# Patient Record
Sex: FEMALE | Race: WHITE | Employment: FULL TIME | URBAN - METROPOLITAN AREA
[De-identification: names, ages, dates, MRNs, and addresses within clinical notes are randomized per-mention and may not be internally consistent; named-entity substitution may affect disease eponyms.]

---

## 2020-07-23 ENCOUNTER — HOSPITAL ENCOUNTER (OUTPATIENT)
Dept: CT IMAGING | Age: 30
Discharge: HOME | End: 2020-07-23
Payer: COMMERCIAL

## 2020-07-23 DIAGNOSIS — R22.1 NECK MASS: ICD-10-CM

## 2020-07-23 PROCEDURE — 70491 CT SOFT TISSUE NECK W/DYE: CPT | Performed by: OTOLARYNGOLOGY

## 2020-07-23 RX ORDER — SODIUM CHLORIDE 0.9 % (FLUSH) 0.9 %
10 SYRINGE (ML) INJECTION
Status: COMPLETED | OUTPATIENT
Start: 2020-07-23 | End: 2020-07-23

## 2020-07-23 RX ADMIN — Medication 10 ML: at 15:34

## 2020-08-05 ENCOUNTER — HOSPITAL ENCOUNTER (OUTPATIENT)
Dept: ULTRASOUND IMAGING | Age: 30
Discharge: HOME | End: 2020-08-05
Attending: OTOLARYNGOLOGY
Payer: COMMERCIAL

## 2020-08-05 DIAGNOSIS — E04.1 CYST OF THYROID: ICD-10-CM

## 2020-08-05 PROCEDURE — 76536 US EXAM OF HEAD AND NECK: CPT

## 2020-08-18 ENCOUNTER — HOSPITAL ENCOUNTER (OUTPATIENT)
Dept: ULTRASOUND IMAGING | Age: 30
Discharge: HOME | End: 2020-08-18
Attending: OTOLARYNGOLOGY
Payer: COMMERCIAL

## 2020-08-18 DIAGNOSIS — E04.1 NONTOXIC UNINODULAR GOITER: ICD-10-CM

## 2020-08-18 PROCEDURE — 74011000250 HC RX REV CODE- 250

## 2020-08-18 PROCEDURE — 77030014115

## 2020-08-18 PROCEDURE — 88173 CYTOPATH EVAL FNA REPORT: CPT

## 2020-08-18 PROCEDURE — 10005 FNA BX W/US GDN 1ST LES: CPT

## 2020-08-18 PROCEDURE — 88172 CYTP DX EVAL FNA 1ST EA SITE: CPT

## 2020-08-18 RX ORDER — SODIUM BICARBONATE 42 MG/ML
INJECTION, SOLUTION INTRAVENOUS
Status: DISCONTINUED
Start: 2020-08-18 | End: 2020-08-18 | Stop reason: SDUPTHER

## 2020-08-18 RX ORDER — SODIUM BICARBONATE 42 MG/ML
1 INJECTION, SOLUTION INTRAVENOUS
Status: COMPLETED | OUTPATIENT
Start: 2020-08-18 | End: 2020-08-18

## 2020-08-18 RX ORDER — LIDOCAINE HYDROCHLORIDE 10 MG/ML
10 INJECTION, SOLUTION EPIDURAL; INFILTRATION; INTRACAUDAL; PERINEURAL
Status: COMPLETED | OUTPATIENT
Start: 2020-08-18 | End: 2020-08-18

## 2020-08-18 RX ADMIN — SODIUM BICARBONATE 1 ML: 42 INJECTION, SOLUTION INTRAVENOUS at 09:45

## 2020-08-18 RX ADMIN — LIDOCAINE HYDROCHLORIDE 5 ML: 10 INJECTION, SOLUTION EPIDURAL; INFILTRATION; INTRACAUDAL; PERINEURAL at 09:45

## 2020-11-04 ENCOUNTER — TRANSCRIBE ORDER (OUTPATIENT)
Dept: SCHEDULING | Age: 30
End: 2020-11-04

## 2020-11-04 DIAGNOSIS — E04.1 NONTOXIC UNINODULAR GOITER: Primary | ICD-10-CM

## 2020-11-10 ENCOUNTER — HOSPITAL ENCOUNTER (OUTPATIENT)
Dept: ULTRASOUND IMAGING | Age: 30
Discharge: HOME | End: 2020-11-10
Attending: OTOLARYNGOLOGY
Payer: COMMERCIAL

## 2020-11-10 DIAGNOSIS — E04.1 NONTOXIC UNINODULAR GOITER: ICD-10-CM

## 2020-11-10 PROCEDURE — 76536 US EXAM OF HEAD AND NECK: CPT

## 2021-03-23 ENCOUNTER — OFFICE VISIT (OUTPATIENT)
Dept: OBGYN CLINIC | Age: 31
End: 2021-03-23
Payer: COMMERCIAL

## 2021-03-23 VITALS
SYSTOLIC BLOOD PRESSURE: 118 MMHG | DIASTOLIC BLOOD PRESSURE: 67 MMHG | WEIGHT: 187 LBS | BODY MASS INDEX: 29.35 KG/M2 | HEIGHT: 67 IN

## 2021-03-23 DIAGNOSIS — Z01.419 WELL WOMAN EXAM: Primary | ICD-10-CM

## 2021-03-23 PROCEDURE — 99385 PREV VISIT NEW AGE 18-39: CPT | Performed by: OBSTETRICS & GYNECOLOGY

## 2021-03-23 RX ORDER — DEXTROAMPHETAMINE SACCHARATE, AMPHETAMINE ASPARTATE, DEXTROAMPHETAMINE SULFATE AND AMPHETAMINE SULFATE 2.5; 2.5; 2.5; 2.5 MG/1; MG/1; MG/1; MG/1
TABLET ORAL
COMMUNITY

## 2021-03-23 RX ORDER — QUETIAPINE FUMARATE 25 MG/1
TABLET, FILM COATED ORAL
COMMUNITY

## 2021-03-23 RX ORDER — DEXTROAMPHETAMINE SACCHARATE, AMPHETAMINE ASPARTATE MONOHYDRATE, DEXTROAMPHETAMINE SULFATE AND AMPHETAMINE SULFATE 5; 5; 5; 5 MG/1; MG/1; MG/1; MG/1
CAPSULE, EXTENDED RELEASE ORAL
COMMUNITY
End: 2022-10-07 | Stop reason: ALTCHOICE

## 2021-03-23 NOTE — PATIENT INSTRUCTIONS
Well Visit, Ages 25 to 48: Care Instructions  Your Care Instructions     Physical exams can help you stay healthy. Your doctor has checked your overall health and may have suggested ways to take good care of yourself. He or she also may have recommended tests. At home, you can help prevent illness with healthy eating, regular exercise, and other steps. Follow-up care is a key part of your treatment and safety. Be sure to make and go to all appointments, and call your doctor if you are having problems. It's also a good idea to know your test results and keep a list of the medicines you take. How can you care for yourself at home? · Reach and stay at a healthy weight. This will lower your risk for many problems, such as obesity, diabetes, heart disease, and high blood pressure. · Get at least 30 minutes of physical activity on most days of the week. Walking is a good choice. You also may want to do other activities, such as running, swimming, cycling, or playing tennis or team sports. Discuss any changes in your exercise program with your doctor. · Do not smoke or allow others to smoke around you. If you need help quitting, talk to your doctor about stop-smoking programs and medicines. These can increase your chances of quitting for good. · Talk to your doctor about whether you have any risk factors for sexually transmitted infections (STIs). Having one sex partner (who does not have STIs and does not have sex with anyone else) is a good way to avoid these infections. · Use birth control if you do not want to have children at this time. Talk with your doctor about the choices available and what might be best for you. · Protect your skin from too much sun. When you're outdoors from 10 a.m. to 4 p.m., stay in the shade or cover up with clothing and a hat with a wide brim. Wear sunglasses that block UV rays. Even when it's cloudy, put broad-spectrum sunscreen (SPF 30 or higher) on any exposed skin.   · See a dentist one or two times a year for checkups and to have your teeth cleaned. · Wear a seat belt in the car. Follow your doctor's advice about when to have certain tests. These tests can spot problems early. For everyone  · Cholesterol. Have the fat (cholesterol) in your blood tested after age 21. Your doctor will tell you how often to have this done based on your age, family history, or other things that can increase your risk for heart disease. · Blood pressure. Have your blood pressure checked during a routine doctor visit. Your doctor will tell you how often to check your blood pressure based on your age, your blood pressure results, and other factors. · Vision. Talk with your doctor about how often to have a glaucoma test.  · Diabetes. Ask your doctor whether you should have tests for diabetes. · Colon cancer. Your risk for colorectal cancer gets higher as you get older. Some experts say that adults should start regular screening at age 48 and stop at age 76. Others say to start before age 48 or continue after age 76. Talk with your doctor about your risk and when to start and stop screening. For women  · Breast exam and mammogram. Talk to your doctor about when you should have a clinical breast exam and a mammogram. Medical experts differ on whether and how often women under 50 should have these tests. Your doctor can help you decide what is right for you. · Cervical cancer screening test and pelvic exam. Begin with a Pap test at age 24. The test often is part of a pelvic exam. Starting at age 27, you may choose to have a Pap test, an HPV test, or both tests at the same time (called co-testing). Talk with your doctor about how often to have testing. · Tests for sexually transmitted infections (STIs). Ask whether you should have tests for STIs. You may be at risk if you have sex with more than one person, especially if your partners do not wear condoms.   For men  · Tests for sexually transmitted infections (STIs). Ask whether you should have tests for STIs. You may be at risk if you have sex with more than one person, especially if you do not wear a condom. · Testicular cancer exam. Ask your doctor whether you should check your testicles regularly. · Prostate exam. Talk to your doctor about whether you should have a blood test (called a PSA test) for prostate cancer. Experts differ on whether and when men should have this test. Some experts suggest it if you are older than 39 and are -American or have a father or brother who got prostate cancer when he was younger than 72. When should you call for help? Watch closely for changes in your health, and be sure to contact your doctor if you have any problems or symptoms that concern you. Where can you learn more? Go to http://www.russell.com/  Enter P072 in the search box to learn more about \"Well Visit, Ages 25 to 48: Care Instructions. \"  Current as of: May 27, 2020               Content Version: 12.6  © 2006-2020 [employer]. Care instructions adapted under license by [employer] (which disclaims liability or warranty for this information). If you have questions about a medical condition or this instruction, always ask your healthcare professional. [place] [address] any warranty or liability for your use of this information.

## 2021-03-23 NOTE — PROGRESS NOTES
Annual exam ages 21-44    Debby Srinivasan is a No obstetric history on file. ,  27 y.o. female WHITE No LMP recorded. .    She presents for her annual checkup. She is having no significant problems. Life with IUD is good. 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 of the 3 injections. Menstrual status:    Her periods are light, moderate in flow. She is using essentially none pads or tampons per day, minimal to none using Mirena. She denies dysmenorrhea. She reports no premenstrual symptoms. Contraception:    The current method of family planning is IUD. Sexual history:     She  has no history on file for sexual activity. .    Medical conditions:    Since her last annual GYN exam about one year ago, she has not the following changes in her health history: none. Pap and Mammogram History:    Her most recent Pap smear was normal, obtained 1 year(s) ago. Breast Cancer History/Substance Abuse: negative    No past medical history on file. Past Surgical History:   Procedure Laterality Date    HX WISDOM TEETH EXTRACTION         Current Outpatient Medications   Medication Sig Dispense Refill    noreth-ethinyl estradiol-iron (GENERESS FE) 0.8mg-25mcg(24) & 75 mg (4) Chew Take  by mouth daily. Allergies: Codeine     Tobacco History:  reports that she has quit smoking. She does not have any smokeless tobacco history on file. Alcohol Abuse:  reports current alcohol use. Drug Abuse:  has no history on file for drug. Family Medical/Cancer History: No family history on file.      Review of Systems - History obtained from the patient  Constitutional: negative for weight loss, fever, night sweats  HEENT: negative for hearing loss, earache, congestion, snoring, sorethroat  CV: negative for chest pain, palpitations, edema  Resp: negative for cough, shortness of breath, wheezing  GI: negative for change in bowel habits, abdominal pain, black or bloody stools  : negative for frequency, dysuria, hematuria, vaginal discharge  MSK: negative for back pain, joint pain, muscle pain  Breast: negative for breast lumps, nipple discharge, galactorrhea  Skin :negative for itching, rash, hives  Neuro: negative for dizziness, headache, confusion, weakness  Psych: negative for anxiety, depression, change in mood  Heme/lymph: negative for bleeding, bruising, pallor    Physical Exam    There were no vitals taken for this visit.     Constitutional  · Appearance: well-nourished, well developed, alert, in no acute distress    HENT  · Head and Face: appears normal    Chest  · Respiratory Effort: breathing unlabored    Breasts  · Inspection of Breasts: breasts symmetrical, no skin changes, no discharge present, nipple appearance normal, no skin retraction present  · Palpation of Breasts and Axillae: no masses present on palpation, no breast tenderness  · Axillary Lymph Nodes: no lymphadenopathy present    Gastrointestinal  · Abdominal Examination: abdomen non-tender to palpation, normal bowel sounds, no masses present  · Liver and spleen: no hepatomegaly present, spleen not palpable  · Hernias: no hernias identified    Genitourinary  · External Genitalia: normal appearance for age, no discharge present, no tenderness present, no inflammatory lesions present, no masses present, no atrophy present  · Vagina: normal vaginal vault without central or paravaginal defects, no discharge present, no inflammatory lesions present, no masses present  · Bladder: non-tender to palpation  · Urethra: appears normal  · Cervix: normal IUD string visualized  · Uterus: normal size, shape and consistency  · Adnexa: no adnexal tenderness present, no adnexal masses present  · Perineum: perineum within normal limits, no evidence of trauma, no rashes or skin lesions present  · Anus: anus within normal limits, no hemorrhoids present  · Inguinal Lymph Nodes: no lymphadenopathy present    Skin  · General Inspection: no rash, no lesions identified    Neurologic/Psychiatric  · Mental Status:  · Orientation: grossly oriented to person, place and time  · Mood and Affect: mood normal, affect appropriate    . Assessment:  Routine gynecologic examination  Her current medical status is satisfactory with no evidence of significant gynecologic issues. Plan:  GC/ chlamydia offered and declined  Patient offered and completed [facility] genetic screening questionnaire  and no changes in personal and family pertinent medical history. Patient declines presence of chaperone during today's visit.    Pap done today  Counseled re: diet, exercise, healthy lifestyle  Return for yearly wellness visits  Gardisil counseling provided  Rec screening mammo at either 34 or 39

## 2021-03-25 LAB
CYTOLOGIST CVX/VAG CYTO: NORMAL
CYTOLOGY CVX/VAG DOC CYTO: NORMAL
CYTOLOGY CVX/VAG DOC THIN PREP: NORMAL
DX ICD CODE: NORMAL
LABCORP, 190119: NORMAL
Lab: NORMAL
OTHER STN SPEC: NORMAL
STAT OF ADQ CVX/VAG CYTO-IMP: NORMAL

## 2021-04-21 ENCOUNTER — TRANSCRIBE ORDER (OUTPATIENT)
Dept: SCHEDULING | Age: 31
End: 2021-04-21

## 2021-04-21 DIAGNOSIS — E04.1 NONTOXIC UNINODULAR GOITER: Primary | ICD-10-CM

## 2021-04-28 ENCOUNTER — HOSPITAL ENCOUNTER (OUTPATIENT)
Dept: ULTRASOUND IMAGING | Age: 31
Discharge: HOME | End: 2021-04-28
Attending: OTOLARYNGOLOGY
Payer: COMMERCIAL

## 2021-04-28 DIAGNOSIS — E04.1 NONTOXIC UNINODULAR GOITER: ICD-10-CM

## 2021-04-28 PROCEDURE — 76536 US EXAM OF HEAD AND NECK: CPT

## 2022-03-02 ENCOUNTER — OFFICE VISIT (OUTPATIENT)
Dept: INTERNAL MEDICINE CLINIC | Age: 32
End: 2022-03-02
Payer: COMMERCIAL

## 2022-03-02 VITALS
TEMPERATURE: 98.2 F | DIASTOLIC BLOOD PRESSURE: 88 MMHG | SYSTOLIC BLOOD PRESSURE: 131 MMHG | RESPIRATION RATE: 19 BRPM | BODY MASS INDEX: 31.32 KG/M2 | WEIGHT: 200 LBS | HEART RATE: 122 BPM

## 2022-03-02 DIAGNOSIS — F90.9 ATTENTION DEFICIT HYPERACTIVITY DISORDER (ADHD), UNSPECIFIED ADHD TYPE: ICD-10-CM

## 2022-03-02 DIAGNOSIS — R00.0 TACHYCARDIA: ICD-10-CM

## 2022-03-02 DIAGNOSIS — Z76.89 ESTABLISHING CARE WITH NEW DOCTOR, ENCOUNTER FOR: Primary | ICD-10-CM

## 2022-03-02 DIAGNOSIS — E66.9 OBESITY (BMI 30.0-34.9): ICD-10-CM

## 2022-03-02 PROCEDURE — 93000 ELECTROCARDIOGRAM COMPLETE: CPT | Performed by: INTERNAL MEDICINE

## 2022-03-02 PROCEDURE — 99203 OFFICE O/P NEW LOW 30 MIN: CPT | Performed by: INTERNAL MEDICINE

## 2022-03-02 RX ORDER — MELATONIN 5 MG
5 CAPSULE ORAL
COMMUNITY
Start: 2020-08-01

## 2022-03-02 RX ORDER — CHOLECALCIFEROL (VITAMIN D3) 125 MCG
5000 CAPSULE ORAL DAILY
COMMUNITY
End: 2022-03-16

## 2022-03-02 RX ORDER — MINERAL OIL
180 ENEMA (ML) RECTAL
COMMUNITY

## 2022-03-02 NOTE — PROGRESS NOTES
Chief Complaint   Patient presents with   Jo Hi Establish Care     1. Have you been to the ER, urgent care clinic since your last visit? Hospitalized since your last visit? No    2. Have you seen or consulted any other health care providers outside of the [address] [place] [address] since your last visit? Include any pap smears or colon screening.  No

## 2022-03-02 NOTE — PROGRESS NOTES
Rupinder Sauceda is a 27 y.o. female and presents with [facility]    . Subjective:    New patient. Establish Care    Pt will be attempting pregnancy in the next 6-12 months  STAT EKG- sinus tach @ 120bpm    PMH- ADD- Dr. Luis Ennis- psychiatrist   -pt has been taking extra short acting adderall bc she has not received her short acting as yet     Allergic rhinitis    SH-    -pt has an IUD in place    FH- mother alive HTN, obesity, depression, hypothyroidism   Father  2020 cholangiocarcinoma,  HTN, obesity   1 brother GERD    HM  Immunizations  Eye care  Dental care  Pap UTD        Review of Systems  Review of systems (12) negative, except noted above. History reviewed. No pertinent past medical history. Past Surgical History:   Procedure Laterality Date    HX WISDOM TEETH EXTRACTION       Social History     Socioeconomic History    Marital status: SINGLE   Tobacco Use    Smoking status: Former Smoker    Smokeless tobacco: Never Used   Vaping Use    Vaping Use: Never used   Substance and Sexual Activity    Alcohol use: Yes    Drug use: Never    Sexual activity: Yes     Partners: Male     Birth control/protection: I.U.D. History reviewed. No pertinent family history.     Allergies   Allergen Reactions    Oxycodone Nausea Only and Nausea and Vomiting    Codeine Nausea Only       Objective:  Visit Vitals  /88 (BP 1 Location: Left upper arm, BP Patient Position: Sitting, BP Cuff Size: Adult)   Pulse (!) 122   Temp 98.2 °F (36.8 °C) (Temporal)   Resp 19   Ht (P) 5' 7\" (1.702 m)   Wt 200 lb (90.7 kg)   BMI (P) 31.32 kg/m²     Physical Exam:   General appearance - alert, well appearing, and in no distress  Mental status - alert, oriented to person, place, and time  EYE-LISA, EOMI, corneas normal, no foreign bodies  ENT-ENT exam normal, no neck nodes or sinus tenderness  Mouth - mucous membranes moist, pharynx normal without lesions  Neck - supple, no significant adenopathy   Chest - clear to auscultation, no wheezes, rales or rhonchi, symmetric air entry   Heart - tachycardic  Abdomen - soft, nontender, nondistended, no masses or organomegaly  Ext-peripheral pulses normal, no pedal edema, no clubbing or cyanosis  Skin-Warm and dry. no hyperpigmentation, vitiligo, or suspicious lesions  Neuro -alert, oriented, normal speech, no focal findings or movement disorder noted        Results for orders placed or performed in visit on 03/23/21   PAP IG, RFX APTIMA HPV ASCUS ([id number])   Result Value Ref Range    Diagnosis Comment     Specimen adequacy Comment     Clinician provided ICD10 Comment     Performed by: Comment     . Ishan Nishant Note: Comment     Test methodology Comment     . Comment        Assessment/Plan:    ICD-10-CM ICD-9-CM    1. Establishing care with new doctor, encounter for  Z76.89 V65.8    2. Attention deficit hyperactivity disorder (ADHD), unspecified ADHD type  H15.6 728.18 METABOLIC PANEL, COMPREHENSIVE      LIPID PANEL      CBC WITH AUTOMATED DIFF      THYROID CASCADE PROFILE      VITAMIN D, 25 HYDROXY      METABOLIC PANEL, COMPREHENSIVE      LIPID PANEL      CBC WITH AUTOMATED DIFF      THYROID CASCADE PROFILE      VITAMIN D, 25 HYDROXY   3. Tachycardia  R00.0 785.0 AMB POC EKG ROUTINE W/ 12 LEADS, INTER & REP      CARDIAC HOLTER MONITOR   4. Obesity (BMI 30.0-34. 9)  R94.1 705.48 METABOLIC PANEL, COMPREHENSIVE      LIPID PANEL      CBC WITH AUTOMATED DIFF      THYROID CASCADE PROFILE      VITAMIN D, 25 HYDROXY      METABOLIC PANEL, COMPREHENSIVE      LIPID PANEL      CBC WITH AUTOMATED DIFF      THYROID CASCADE PROFILE      VITAMIN D, 25 HYDROXY     Orders Placed This Encounter    METABOLIC PANEL, COMPREHENSIVE     Standing Status:   Future     Number of Occurrences:   1     Standing Expiration Date:   3/2/2023    LIPID PANEL     Standing Status:   Future     Number of Occurrences:   1     Standing Expiration Date:   3/2/2023    CBC WITH AUTOMATED DIFF     Standing Status:   Future Number of Occurrences:   1     Standing Expiration Date:   3/2/2023    THYROID CASCADE PROFILE     Standing Status:   Future     Number of Occurrences:   1     Standing Expiration Date:   3/2/2023    VITAMIN D, 25 HYDROXY     Standing Status:   Future     Number of Occurrences:   1     Standing Expiration Date:   3/2/2023    AMB POC EKG ROUTINE W/ 12 LEADS, INTER & REP     Order Specific Question:   Reason for Exam:     Answer:   tachycardia    cholecalciferol (VITAMIN D3) (5000 Units /125 mcg) capsule     Sig: Take 5,000 Units by mouth daily.  fexofenadine (Allegra Allergy) 180 mg tablet     Sig: Take 180 mg by mouth daily as needed.  melatonin 5 mg cap capsule     Sig: Take 5 mg by mouth nightly.  zinc 50 mg tab tablet     Sig: Take 50 mg by mouth daily. 1. Establishing care with new doctor, encounter for  Completed    2. Attention deficit hyperactivity disorder (ADHD), unspecified ADHD type  F/u psych  - METABOLIC PANEL, COMPREHENSIVE; Future  - LIPID PANEL; Future  - CBC WITH AUTOMATED DIFF; Future  - THYROID CASCADE PROFILE; Future  - VITAMIN D, 25 HYDROXY; Future  - METABOLIC PANEL, COMPREHENSIVE  - LIPID PANEL  - CBC WITH AUTOMATED DIFF  - THYROID CASCADE PROFILE  - VITAMIN D, 25 HYDROXY    3. Tachycardia  ? Due to extra adderall  - AMB POC EKG ROUTINE W/ 12 LEADS, INTER & REP  - CARDIAC HOLTER MONITOR; Future    4. Obesity (BMI 30.0-34. 9)  D/w pt daily walking (at least 20 minutes), healthy diet w fruits and/or veggies w each meal, portion sizes and weight loss    - METABOLIC PANEL, COMPREHENSIVE; Future  - LIPID PANEL; Future  - CBC WITH AUTOMATED DIFF; Future  - THYROID CASCADE PROFILE; Future  - VITAMIN D, 25 HYDROXY; Future  - METABOLIC PANEL, COMPREHENSIVE  - LIPID PANEL  - CBC WITH AUTOMATED DIFF  - THYROID CASCADE PROFILE  - VITAMIN D, 25 HYDROXY    There are no Patient Instructions on file for this visit.    Follow-up and Dispositions    · Return in about 6 weeks (around 4/13/2022). I have reviewed with the patient details of the assessment and plan and all questions were answered. Relevent patient education was performed. The most recent lab findings were reviewed with the patient. An After Visit Summary was printed and given to the patient.       Annel Malloy MD

## 2022-03-03 LAB
25(OH)D3+25(OH)D2 SERPL-MCNC: 27.6 NG/ML (ref 30–100)
ALBUMIN SERPL-MCNC: 4.8 G/DL (ref 3.8–4.8)
ALBUMIN/GLOB SERPL: 2.1 {RATIO} (ref 1.2–2.2)
ALP SERPL-CCNC: 71 IU/L (ref 44–121)
ALT SERPL-CCNC: 18 IU/L (ref 0–32)
AST SERPL-CCNC: 17 IU/L (ref 0–40)
BASOPHILS # BLD AUTO: 0 X10E3/UL (ref 0–0.2)
BASOPHILS NFR BLD AUTO: 0 %
BILIRUB SERPL-MCNC: 0.5 MG/DL (ref 0–1.2)
BUN SERPL-MCNC: 11 MG/DL (ref 6–20)
BUN/CREAT SERPL: 13 (ref 9–23)
CALCIUM SERPL-MCNC: 10 MG/DL (ref 8.7–10.2)
CHLORIDE SERPL-SCNC: 102 MMOL/L (ref 96–106)
CHOLEST SERPL-MCNC: 186 MG/DL (ref 100–199)
CO2 SERPL-SCNC: 23 MMOL/L (ref 20–29)
CREAT SERPL-MCNC: 0.86 MG/DL (ref 0.57–1)
EGFR: 92 ML/MIN/1.73
EOSINOPHIL # BLD AUTO: 0.1 X10E3/UL (ref 0–0.4)
EOSINOPHIL NFR BLD AUTO: 1 %
ERYTHROCYTE [DISTWIDTH] IN BLOOD BY AUTOMATED COUNT: 12.6 % (ref 11.7–15.4)
GLOBULIN SER CALC-MCNC: 2.3 G/DL (ref 1.5–4.5)
GLUCOSE SERPL-MCNC: 92 MG/DL (ref 65–99)
HCT VFR BLD AUTO: 38.4 % (ref 34–46.6)
HDLC SERPL-MCNC: 54 MG/DL
HGB BLD-MCNC: 12.9 G/DL (ref 11.1–15.9)
IMM GRANULOCYTES # BLD AUTO: 0 X10E3/UL (ref 0–0.1)
IMM GRANULOCYTES NFR BLD AUTO: 0 %
IMP & REVIEW OF LAB RESULTS: NORMAL
INTERPRETATION: NORMAL
INTERPRETIVE COMMENT, 330017: NORMAL
LDLC SERPL CALC-MCNC: 105 MG/DL (ref 0–99)
LYMPHOCYTES # BLD AUTO: 2.3 X10E3/UL (ref 0.7–3.1)
LYMPHOCYTES NFR BLD AUTO: 28 %
MCH RBC QN AUTO: 29.3 PG (ref 26.6–33)
MCHC RBC AUTO-ENTMCNC: 33.6 G/DL (ref 31.5–35.7)
MCV RBC AUTO: 87 FL (ref 79–97)
MONOCYTES # BLD AUTO: 0.6 X10E3/UL (ref 0.1–0.9)
MONOCYTES NFR BLD AUTO: 7 %
NEUTROPHILS # BLD AUTO: 5.2 X10E3/UL (ref 1.4–7)
NEUTROPHILS NFR BLD AUTO: 64 %
PLATELET # BLD AUTO: 272 X10E3/UL (ref 150–450)
POTASSIUM SERPL-SCNC: 4.2 MMOL/L (ref 3.5–5.2)
PROT SERPL-MCNC: 7.1 G/DL (ref 6–8.5)
RBC # BLD AUTO: 4.41 X10E6/UL (ref 3.77–5.28)
SODIUM SERPL-SCNC: 140 MMOL/L (ref 134–144)
T4 FREE SERPL-MCNC: 1.3 NG/DL (ref 0.82–1.77)
THYROPEROXIDASE AB SERPL-ACNC: <8 IU/ML (ref 0–34)
TRIGL SERPL-MCNC: 153 MG/DL (ref 0–149)
TSH SERPL DL<=0.005 MIU/L-ACNC: 4.65 UIU/ML (ref 0.45–4.5)
VLDLC SERPL CALC-MCNC: 27 MG/DL (ref 5–40)
WBC # BLD AUTO: 8.1 X10E3/UL (ref 3.4–10.8)

## 2022-03-09 ENCOUNTER — HOSPITAL ENCOUNTER (OUTPATIENT)
Dept: VASCULAR SURGERY | Age: 32
Discharge: HOME | End: 2022-03-09
Payer: COMMERCIAL

## 2022-03-09 DIAGNOSIS — R00.0 TACHYCARDIA: ICD-10-CM

## 2022-03-09 PROCEDURE — 93225 XTRNL ECG REC<48 HRS REC: CPT

## 2022-03-09 NOTE — PROGRESS NOTES
24 hour Holter monitor placed on patient. Patient educated on device and extra supplies provided. No questions at this time.      Monitor # [id number]

## 2022-03-16 ENCOUNTER — OFFICE VISIT (OUTPATIENT)
Dept: INTERNAL MEDICINE CLINIC | Age: 32
End: 2022-03-16
Payer: COMMERCIAL

## 2022-03-16 VITALS
BODY MASS INDEX: 30.45 KG/M2 | DIASTOLIC BLOOD PRESSURE: 85 MMHG | WEIGHT: 194 LBS | HEIGHT: 67 IN | HEART RATE: 92 BPM | OXYGEN SATURATION: 98 % | SYSTOLIC BLOOD PRESSURE: 116 MMHG | TEMPERATURE: 98.2 F | RESPIRATION RATE: 19 BRPM

## 2022-03-16 DIAGNOSIS — F90.9 ATTENTION DEFICIT HYPERACTIVITY DISORDER (ADHD), UNSPECIFIED ADHD TYPE: ICD-10-CM

## 2022-03-16 DIAGNOSIS — E55.9 VITAMIN D DEFICIENCY: ICD-10-CM

## 2022-03-16 DIAGNOSIS — R00.0 TACHYCARDIA: Primary | ICD-10-CM

## 2022-03-16 PROCEDURE — 99214 OFFICE O/P EST MOD 30 MIN: CPT | Performed by: INTERNAL MEDICINE

## 2022-03-16 RX ORDER — LEVONORGESTREL 52 MG/1
INTRAUTERINE DEVICE INTRAUTERINE
COMMUNITY
Start: 2020-01-29

## 2022-03-16 RX ORDER — ERGOCALCIFEROL 1.25 MG/1
50000 CAPSULE ORAL
Qty: 12 CAPSULE | Refills: 0 | Status: SHIPPED | OUTPATIENT
Start: 2022-03-16 | End: 2022-10-07 | Stop reason: ALTCHOICE

## 2022-03-16 RX ORDER — SODIUM FLUORIDE1.1%, POTASSIUM NITRATE 5% 5.8; 57.5 MG/ML; MG/ML
GEL, DENTIFRICE DENTAL
COMMUNITY
Start: 2022-03-13

## 2022-03-16 RX ORDER — SPIRONOLACTONE 100 MG/1
50 TABLET, FILM COATED ORAL 2 TIMES DAILY
COMMUNITY
Start: 2022-02-15 | End: 2022-10-07 | Stop reason: ALTCHOICE

## 2022-03-16 NOTE — PROGRESS NOTES
Chief Complaint   Patient presents with    Labs    Follow-up     Holter monitor       1. Have you been to the ER, urgent care clinic since your last visit? Hospitalized since your last visit? No    2. Have you seen or consulted any other health care providers outside of the [address] [place] [address] since your last visit? Include any pap smears or colon screening.  No

## 2022-03-16 NOTE — PROGRESS NOTES
Edgar Aleman is a 27 y.o. female and presents with Labs and Follow-up (Holter monitor)    . Subjective:    Pt is here for f/u tachycardia. Pts pulse has decreased as she is back on her usual adderall dose. Pts holter result not reported as yet    Pt requests referrals to establish w new therapist and psychiatrist    Pt will be attempting pregnancy in the next 6-12 months. She has an appt pending w gyn to discuss adderrall and pregnancy    Pt reports she has been taking 5000IU vit d, when her vit d level was drawn    Lab Results   Component Value Date/Time    VITAMIN D, 25-HYDROXY 27.6 (L) 2022 10:58 AM       PMH- ADD- Dr. Jimbo Wolf- psychiatrist   -pt has been taking extra short acting adderall bc she has not received her long acting as yet     Allergic rhinitis    SH-    -pt has an IUD in place    FH- mother alive HTN, obesity, depression, hypothyroidism   Father  2020 cholangiocarcinoma,  HTN, obesity   1 brother GERD    HM  Immunizations  Eye care  Dental care  Pap UTD        Review of Systems  Review of systems (12) negative, except noted above. No past medical history on file. Past Surgical History:   Procedure Laterality Date    HX WISDOM TEETH EXTRACTION       Social History     Socioeconomic History    Marital status:    Tobacco Use    Smoking status: Former Smoker    Smokeless tobacco: Never Used   Vaping Use    Vaping Use: Never used   Substance and Sexual Activity    Alcohol use: Yes    Drug use: Never    Sexual activity: Yes     Partners: Male     Birth control/protection: I.U.D. No family history on file.     Allergies   Allergen Reactions    Oxycodone Nausea Only and Nausea and Vomiting    Codeine Nausea Only       Objective:  Visit Vitals  Temp 98.2 °F (36.8 °C) (Temporal)   Ht 5' 7\" (1.702 m)   Wt 194 lb (88 kg)   BMI 30.38 kg/m²     Physical Exam:   General appearance - alert, well appearing, and in no distress  Mental status - alert, oriented to person, place, and time  EYE-LISA, EOMI, corneas normal, no foreign bodies  ENT-ENT exam normal, no neck nodes or sinus tenderness  Mouth - mucous membranes moist, pharynx normal without lesions  Neck - supple, no significant adenopathy   Chest - clear to auscultation, no wheezes, rales or rhonchi, symmetric air entry   Heart - tachycardic  Abdomen - soft, nontender, nondistended, no masses or organomegaly  Ext-peripheral pulses normal, no pedal edema, no clubbing or cyanosis  Skin-Warm and dry. no hyperpigmentation, vitiligo, or suspicious lesions  Neuro -alert, oriented, normal speech, no focal findings or movement disorder noted        Results for orders placed or performed in visit on 76/35/49   METABOLIC PANEL, COMPREHENSIVE   Result Value Ref Range    Glucose 92 65 - 99 mg/dL    BUN 11 6 - 20 mg/dL    Creatinine 0.86 0.57 - 1.00 mg/dL    eGFR 92 >59 mL/min/1.73    BUN/Creatinine ratio 13 9 - 23    Sodium 140 134 - 144 mmol/L    Potassium 4.2 3.5 - 5.2 mmol/L    Chloride 102 96 - 106 mmol/L    CO2 23 20 - 29 mmol/L    Calcium 10.0 8.7 - 10.2 mg/dL    Protein, total 7.1 6.0 - 8.5 g/dL    Albumin 4.8 3.8 - 4.8 g/dL    GLOBULIN, TOTAL 2.3 1.5 - 4.5 g/dL    A-G Ratio 2.1 1.2 - 2.2    Bilirubin, total 0.5 0.0 - 1.2 mg/dL    Alk.  phosphatase 71 44 - 121 IU/L    AST (SGOT) 17 0 - 40 IU/L    ALT (SGPT) 18 0 - 32 IU/L   LIPID PANEL   Result Value Ref Range    Cholesterol, total 186 100 - 199 mg/dL    Triglyceride 153 (H) 0 - 149 mg/dL    HDL Cholesterol 54 >39 mg/dL    VLDL, calculated 27 5 - 40 mg/dL    LDL, calculated 105 (H) 0 - 99 mg/dL   CBC WITH AUTOMATED DIFF   Result Value Ref Range    WBC 8.1 3.4 - 10.8 x10E3/uL    RBC 4.41 3.77 - 5.28 x10E6/uL    HGB 12.9 11.1 - 15.9 g/dL    HCT 38.4 34.0 - 46.6 %    MCV 87 79 - 97 fL    MCH 29.3 26.6 - 33.0 pg    MCHC 33.6 31.5 - 35.7 g/dL    RDW 12.6 11.7 - 15.4 %    PLATELET 152 039 - 573 x10E3/uL    NEUTROPHILS 64 Not Estab. %    Lymphocytes 28 Not Estab. %    MONOCYTES 7 Not Estab. %    EOSINOPHILS 1 Not Estab. %    BASOPHILS 0 Not Estab. %    ABS. NEUTROPHILS 5.2 1.4 - 7.0 x10E3/uL    Abs Lymphocytes 2.3 0.7 - 3.1 x10E3/uL    ABS. MONOCYTES 0.6 0.1 - 0.9 x10E3/uL    ABS. EOSINOPHILS 0.1 0.0 - 0.4 x10E3/uL    ABS. BASOPHILS 0.0 0.0 - 0.2 x10E3/uL    IMMATURE GRANULOCYTES 0 Not Estab. %    ABS. IMM. GRANS. 0.0 0.0 - 0.1 x10E3/uL   THYROID CASCADE PROFILE   Result Value Ref Range    TSH 4.650 (H) 0.450 - 4.500 uIU/mL   VITAMIN D, 25 HYDROXY   Result Value Ref Range    VITAMIN D, 25-HYDROXY 27.6 (L) 30.0 - 100.0 ng/mL   CVD REPORT   Result Value Ref Range    INTERPRETATION Note    CKD REPORT   Result Value Ref Range    Interpretation Note    THYROXINE (T4) FREE, DIRECT, S   Result Value Ref Range    T4,Free,(Direct) 1.30 0.82 - 1.77 ng/dL   THYROID PEROXIDASE (TPO) AB   Result Value Ref Range    Thyroid peroxidase (TPO) Ab <8 0 - 34 IU/mL    Interpretive Comment Comment        Assessment/Plan:  No diagnosis found. Orders Placed This Encounter    spironolactone (ALDACTONE) 100 mg tablet     Sig: Take 100 mg by mouth daily.  levonorgestreL (Mirena) 20 mcg/24 hours (7 yrs) 52 mg IUD     Sig: Mirena 20 mcg/24 hours (5 yrs) 52 mg intrauterine device   Take 1 device every day by intrauteri route.  sodium fluoride-pot nitrate 1.1-5 % pste       1. Tachycardia  Resolved    2. Vitamin D deficiency    - ergocalciferol (ERGOCALCIFEROL) 1,250 mcg (50,000 unit) capsule; Take 1 Capsule by mouth every seven (7) days. Dispense: 12 Capsule; Refill: 0    3. Attention deficit hyperactivity disorder (ADHD), unspecified ADHD type    - REFERRAL TO PSYCHIATRY  - REFERRAL TO BEHAVIORAL HEALTH      There are no Patient Instructions on file for this visit. I have reviewed with the patient details of the assessment and plan and all questions were answered. Relevent patient education was performed. The most recent lab findings were reviewed with the patient.     An After Visit Summary was printed and given to the patient.       Leo Watts MD

## 2022-03-17 ENCOUNTER — VIRTUAL VISIT (OUTPATIENT)
Dept: SOCIAL WORK | Age: 32
End: 2022-03-17
Payer: COMMERCIAL

## 2022-03-17 DIAGNOSIS — F43.21 COMPLICATED GRIEF: ICD-10-CM

## 2022-03-17 DIAGNOSIS — F90.9 ATTENTION DEFICIT HYPERACTIVITY DISORDER (ADHD), UNSPECIFIED ADHD TYPE: ICD-10-CM

## 2022-03-17 DIAGNOSIS — F39 MOOD DISORDER (HCC): Primary | ICD-10-CM

## 2022-03-18 PROBLEM — Z76.89 ESTABLISHING CARE WITH NEW DOCTOR, ENCOUNTER FOR: Status: ACTIVE | Noted: 2022-03-02

## 2022-03-18 PROCEDURE — 93227 XTRNL ECG REC<48 HR R&I: CPT | Performed by: INTERNAL MEDICINE

## 2022-03-19 PROBLEM — F90.9 ATTENTION DEFICIT HYPERACTIVITY DISORDER (ADHD), UNSPECIFIED ADHD TYPE: Status: ACTIVE | Noted: 2022-03-02

## 2022-03-24 PROBLEM — F39 MOOD DISORDER (HCC): Status: ACTIVE | Noted: 2022-03-17

## 2022-03-24 PROBLEM — F43.21 COMPLICATED GRIEF: Status: ACTIVE | Noted: 2022-03-17

## 2022-04-04 ENCOUNTER — OFFICE VISIT (OUTPATIENT)
Dept: OBGYN CLINIC | Age: 32
End: 2022-04-04
Payer: COMMERCIAL

## 2022-04-04 VITALS — SYSTOLIC BLOOD PRESSURE: 128 MMHG | WEIGHT: 195 LBS | BODY MASS INDEX: 30.54 KG/M2 | DIASTOLIC BLOOD PRESSURE: 78 MMHG

## 2022-04-04 DIAGNOSIS — Z01.419 ENCOUNTER FOR GYNECOLOGICAL EXAMINATION WITHOUT ABNORMAL FINDING: Primary | ICD-10-CM

## 2022-04-04 PROCEDURE — 99395 PREV VISIT EST AGE 18-39: CPT | Performed by: OBSTETRICS & GYNECOLOGY

## 2022-04-04 NOTE — PROGRESS NOTES
Annual exam ages 21-44    Sandra Persaud is a ,  34 y.o. female WHITE/NON- No LMP recorded. (Menstrual status: IUD). .    She presents for her annual checkup. She is having no significant problems. Thinking of trying to conceive later on this year. Menstrual status:    Her periods are spotting in flow. She denies dysmenorrhea. She reports no premenstrual symptoms. Contraception:    The current method of family planning is IUD. Sexual history:     She  reports being sexually active and has had partner(s) who are male. She reports using the following method of birth control/protection: I.U.D.. Laura Ye Medical conditions:    Since her last annual GYN exam about one year ago, she has not the following changes in her health history: none. Pap and Mammogram History:    Her most recent Pap smear was normal, obtained 1 year(s) ago. Breast Cancer History/Substance Abuse: negative    No past medical history on file. Past Surgical History:   Procedure Laterality Date    HX WISDOM TEETH EXTRACTION         Current Outpatient Medications   Medication Sig Dispense Refill    spironolactone (ALDACTONE) 100 mg tablet Take 100 mg by mouth daily.  levonorgestreL (Mirena) 20 mcg/24 hours (7 yrs) 52 mg IUD Mirena 20 mcg/24 hours (5 yrs) 52 mg intrauterine device   Take 1 device every day by intrauteri route.  sodium fluoride-pot nitrate 1.1-5 % pste       ergocalciferol (ERGOCALCIFEROL) 1,250 mcg (50,000 unit) capsule Take 1 Capsule by mouth every seven (7) days. 12 Capsule 0    fexofenadine (Allegra Allergy) 180 mg tablet Take 180 mg by mouth daily as needed.  melatonin 5 mg cap capsule Take 5 mg by mouth nightly.  zinc 50 mg tab tablet Take 50 mg by mouth daily.       amphetamine-dextroamphetamine XR (ADDERALL XR) 20 mg XR capsule dextroamphetamine-amphetamine ER 20 mg 24hr capsule,extend release      QUEtiapine (SEROquel) 25 mg tablet quetiapine 25 mg tablet      dextroamphetamine-amphetamine (ADDERALL) 10 mg tablet dextroamphetamine-amphetamine 10 mg tablet       Allergies: Oxycodone and Codeine     Tobacco History:  reports that she has quit smoking. She has never used smokeless tobacco.  Alcohol Abuse:  reports current alcohol use. Drug Abuse:  reports no history of drug use. Family Medical/Cancer History: No family history on file.      Review of Systems - History obtained from the patient  Constitutional: negative for weight loss, fever, night sweats  HEENT: negative for hearing loss, earache, congestion, snoring, sorethroat  CV: negative for chest pain, palpitations, edema  Resp: negative for cough, shortness of breath, wheezing  GI: negative for change in bowel habits, abdominal pain, black or bloody stools  : negative for frequency, dysuria, hematuria, vaginal discharge  MSK: negative for back pain, joint pain, muscle pain  Breast: negative for breast lumps, nipple discharge, galactorrhea  Skin :negative for itching, rash, hives  Neuro: negative for dizziness, headache, confusion, weakness  Psych: negative for anxiety, depression, change in mood  Heme/lymph: negative for bleeding, bruising, pallor    Physical Exam    Visit Vitals  /78 (BP 1 Location: Left upper arm, BP Patient Position: Sitting)   Wt 195 lb (88.5 kg)   BMI 30.54 kg/m²       Constitutional  · Appearance: well-nourished, well developed, alert, in no acute distress    HENT  · Head and Face: appears normal    Chest  · Respiratory Effort: breathing unlabored    Breasts  · Inspection of Breasts: breasts symmetrical, no skin changes, no discharge present, nipple appearance normal, no skin retraction present  · Palpation of Breasts and Axillae: no masses present on palpation, no breast tenderness  · Axillary Lymph Nodes: no lymphadenopathy present    Gastrointestinal  · Abdominal Examination: abdomen non-tender to palpation, normal bowel sounds, no masses present  · Liver and spleen: no hepatomegaly present, spleen not palpable  · Hernias: no hernias identified    Genitourinary  · External Genitalia: normal appearance for age, no discharge present, no tenderness present, no inflammatory lesions present, no masses present, no atrophy present  · Vagina: normal vaginal vault without central or paravaginal defects, no discharge present, no inflammatory lesions present, no masses present  · Bladder: non-tender to palpation  · Urethra: appears normal  · Cervix: normal IUD string visualized  · Uterus: normal size, shape and consistency  · Adnexa: no adnexal tenderness present, no adnexal masses present  · Perineum: perineum within normal limits, no evidence of trauma, no rashes or skin lesions present  · Anus: anus within normal limits, no hemorrhoids present  · Inguinal Lymph Nodes: no lymphadenopathy present    Skin  · General Inspection: no rash, no lesions identified    Neurologic/Psychiatric  · Mental Status:  · Orientation: grossly oriented to person, place and time  · Mood and Affect: mood normal, affect appropriate    . Assessment:  Routine gynecologic examination  Her current medical status is satisfactory with no evidence of significant gynecologic issues. Plan:  GC/ chlamydia offered and declined. Patient declines presence of chaperone during today's visit. Pap done today  Preconceptual counseling reviewed and all questions answered.   Counseled re: diet, exercise, healthy lifestyle  Return for yearly wellness visits  Gardisil counseling provided  Rec screening mammo at either 34 or 39

## 2022-04-04 NOTE — PATIENT INSTRUCTIONS
Pelvic Exam: Care Instructions  Overview     When your doctor examines your pelvic organs, it's called a pelvic exam. This exam is done to evaluate symptoms, such as pelvic pain or abnormal vaginal bleeding and discharge. It may also be done to collect samples of cells for cervical cancer screening. Before your exam, it's important to share some information with your doctor. You can talk about any concerns you may have. Your doctor will also want to know if you are pregnant or use birth control. And your doctor will want to hear about any problems, surgeries, or procedures you have had in your pelvic area. You will also need to tell your doctor when your last period was. Follow-up care is a key part of your treatment and safety. Be sure to make and go to all appointments, and call your doctor if you are having problems. It's also a good idea to know your test results and keep a list of the medicines you take. How is a pelvic exam done? · During a pelvic exam, you will:  ? Take off your clothes below the waist. You will get a paper or cloth cover to put over the lower half of your body. ? Lie on your back on an exam table with your feet and legs supported by footrests. · The doctor may:  ? Ask you to relax your knees. Your knees need to lean out, toward the walls. ? Check the opening of your vagina for sores or swelling. ? Gently put a tool called a speculum into your vagina. It opens the vagina a little bit. You may feel some pressure. The speculum lets your doctor see inside the vagina. ? Use a small brush, spatula, or swab to get a sample for testing. The doctor then removes the speculum. ? Put on gloves and put one or two fingers of one hand into your vagina. The other hand goes on your lower belly. This lets your doctor feel your pelvic organs. You will probably feel some pressure. ? Put one gloved finger into your rectum and one into your vagina, if needed.  This can also help check your pelvic organs. You may have a small amount of vaginal discharge or bleeding after the exam.  Why is a pelvic exam done? A pelvic exam may be done:  · To collect samples of cells for cervical cancer screening. · To check for vaginal infection. · To check for sexually transmitted infections, such as chlamydia or herpes. · To help find the cause of abnormal uterine bleeding. · To look for problems like uterine fibroids, ovarian cysts, or uterine prolapse. · To help find the cause of pelvic or belly pain. · Before inserting an intrauterine device (IUD). · To collect evidence if you've been sexually assaulted. What are the risks of a pelvic exam?  There is a small chance that the doctor will find something on a pelvic exam that would not have caused a problem. This is called overdiagnosis. It could lead to tests or treatment you don't need. When should you call for help? Watch closely for changes in your health, and be sure to contact your doctor if you have any problems. Where can you learn more? Go to http://www.gray.com/  Enter M421 in the search box to learn more about \"Pelvic Exam: Care Instructions. \"  Current as of: November 22, 2021               Content Version: 13.2  © 5813-1153 "[employer]". Care instructions adapted under license by [employer] (which disclaims liability or warranty for this information). If you have questions about a medical condition or this instruction, always ask your healthcare professional. [place] [address] any warranty or liability for your use of this information.

## 2022-04-29 ENCOUNTER — TRANSCRIBE ORDER (OUTPATIENT)
Dept: SCHEDULING | Age: 32
End: 2022-04-29

## 2022-04-29 DIAGNOSIS — E04.1 NONTOXIC UNINODULAR GOITER: Primary | ICD-10-CM

## 2022-05-05 ENCOUNTER — HOSPITAL ENCOUNTER (OUTPATIENT)
Dept: ULTRASOUND IMAGING | Age: 32
Discharge: HOME | End: 2022-05-05
Attending: OTOLARYNGOLOGY
Payer: COMMERCIAL

## 2022-05-05 DIAGNOSIS — E04.1 NONTOXIC UNINODULAR GOITER: ICD-10-CM

## 2022-05-05 PROCEDURE — 76536 US EXAM OF HEAD AND NECK: CPT

## 2022-10-07 ENCOUNTER — OFFICE VISIT (OUTPATIENT)
Dept: OBGYN CLINIC | Age: 32
End: 2022-10-07
Payer: COMMERCIAL

## 2022-10-07 VITALS — WEIGHT: 196 LBS | DIASTOLIC BLOOD PRESSURE: 80 MMHG | SYSTOLIC BLOOD PRESSURE: 115 MMHG | BODY MASS INDEX: 30.7 KG/M2

## 2022-10-07 DIAGNOSIS — Z30.432 ENCOUNTER FOR IUD REMOVAL: Primary | ICD-10-CM

## 2022-10-07 PROCEDURE — 99213 OFFICE O/P EST LOW 20 MIN: CPT | Performed by: OBSTETRICS & GYNECOLOGY

## 2022-10-07 PROCEDURE — 58301 REMOVE INTRAUTERINE DEVICE: CPT | Performed by: OBSTETRICS & GYNECOLOGY

## 2022-10-07 RX ORDER — SPIRONOLACTONE 50 MG/1
50 TABLET, FILM COATED ORAL 2 TIMES DAILY
COMMUNITY

## 2022-10-07 NOTE — PROGRESS NOTES
Cristina Beth is a 34 y.o. female who complains of  labial cyst. Patient has seen derm who prescribed antibiotics, which helped a little but is now back. Patient would also like IUD removed today. Preconceptual counseling reviewed and all questions answered. Her current method of family planning is IUD. The patient is sexually active. She developed this problem approximately 3 month ago. Her relevant past medical history:   No past medical history on file. Past Surgical History:   Procedure Laterality Date    HX WISDOM TEETH EXTRACTION       Social History     Occupational History    Not on file   Tobacco Use    Smoking status: Former    Smokeless tobacco: Never   Vaping Use    Vaping Use: Never used   Substance and Sexual Activity    Alcohol use: Yes    Drug use: Never    Sexual activity: Yes     Partners: Male     Birth control/protection: I.U.D. No family history on file. Allergies   Allergen Reactions    Oxycodone Nausea Only and Nausea and Vomiting    Codeine Nausea Only     Prior to Admission medications    Medication Sig Start Date End Date Taking? Authorizing Provider   spironolactone (ALDACTONE) 50 mg tablet Take 50 mg by mouth two (2) times a day. Yes Provider, Historical   levonorgestreL (Mirena) 20 mcg/24 hours (7 yrs) 52 mg IUD Mirena 20 mcg/24 hours (5 yrs) 52 mg intrauterine device   Take 1 device every day by intrauteri route. 1/29/20  Yes Provider, Historical   sodium fluoride-pot nitrate 1.1-5 % pste  3/13/22  Yes Provider, Historical   fexofenadine (ALLEGRA) 180 mg tablet Take 180 mg by mouth daily as needed. Yes Provider, Historical   melatonin 5 mg cap capsule Take 5 mg by mouth nightly. 8/1/20  Yes Provider, Historical   zinc 50 mg tab tablet Take 50 mg by mouth daily.  8/10/21  Yes Provider, Historical   QUEtiapine (SEROquel) 25 mg tablet quetiapine 25 mg tablet   Yes Provider, Historical   dextroamphetamine-amphetamine (ADDERALL) 10 mg tablet dextroamphetamine-amphetamine 10 mg tablet   Yes Provider, Historical   spironolactone (ALDACTONE) 100 mg tablet Take 50 mg by mouth two (2) times a day. 2/15/22   Provider, Historical   ergocalciferol (ERGOCALCIFEROL) 1,250 mcg (50,000 unit) capsule Take 1 Capsule by mouth every seven (7) days.  3/16/22   Randall Morton MD   amphetamine-dextroamphetamine XR (ADDERALL XR) 20 mg XR capsule dextroamphetamine-amphetamine ER 20 mg 24hr capsule,extend release    Provider, Historical        Review of Systems - History obtained from the patient  Constitutional: negative for weight loss, fever, night sweats  HEENT: negative for hearing loss, earache, congestion, snoring, sorethroat  CV: negative for chest pain, palpitations, edema  Resp: negative for cough, shortness of breath, wheezing  Breast: negative for breast lumps, nipple discharge, galactorrhea  GI: negative for change in bowel habits, abdominal pain, black or bloody stools  : negative for frequency, dysuria, hematuria  MSK: negative for back pain, joint pain, muscle pain  Skin: negative for itching, rash, hives  Neuro: negative for dizziness, headache, confusion, weakness  Psych: negative for anxiety, depression, change in mood  Heme/lymph: negative for bleeding, bruising, pallor      Objective:  Visit Vitals  /80   Wt 196 lb (88.9 kg)   BMI 30.70 kg/m²          PHYSICAL EXAMINATION    Constitutional  Appearance: well-nourished, well developed, alert, in no acute distress    HENT  Head and Face: appears normal    Gastrointestinal  Abdominal Examination: abdomen non-tender to palpation, normal bowel sounds, no masses present  Liver and spleen: no hepatomegaly present, spleen not palpable  Hernias: no hernias identified    Genitourinary  External Genitalia: normal appearance for age, no discharge present, no tenderness present, no inflammatory lesions present, no masses present, no atrophy present; lesion c/w healed folliculitis right buttocks  Vagina: normal vaginal vault without central or paravaginal defects, no discharge present, no inflammatory lesions present, no masses present  Bladder: non-tender to palpation  Urethra: appears normal  Cervix: normal IUD string visualized   Uterus: normal size, shape and consistency  Adnexa: no adnexal tenderness present, no adnexal masses present  Perineum: perineum within normal limits, no evidence of trauma, no rashes or skin lesions present  Anus: anus within normal limits, no hemorrhoids present  Inguinal Lymph Nodes: no lymphadenopathy present    Skin  General Inspection: no rash, no lesions identified    Neurologic/Psychiatric  Mental Status:  Orientation: grossly oriented to person, place and time  Mood and Affect: mood normal, affect appropriate  IUD REMOVAL  Indications for Removal:  Rosezena Prader is a ,  34 y.o. female WHITE/NON- whose No LMP recorded. (Menstrual status: IUD). was on . who presents today for IUD removal. Her current IUD was placed several years ago. She has not had any problems with the IUD. She requests removal of the IUD because considering conception. The IUD removal procedure was discussed with the patient and she had no further questions. Procedure: The patient was placed in a dorsal lithotomy position and appropriately draped. On bimanual exam the uterus was anterior and normal in size with no tenderness present. A speculum exam was performed and the cervix was visualized. The IUD string was visualized. Using ring forceps , the string was grasped and the IUD removed intact. The IUD was shown to the patient. Patient tolerated removal with no significant discomfort. Assessment:    IUD removal  Preconceptual counseling  Folliculitis- resolved    Plan:   Conservative management fully reviewed and all questions answered. Patient declines presence of chaperone during today's visit.

## 2023-01-17 ENCOUNTER — VIRTUAL VISIT (OUTPATIENT)
Dept: BEHAVIORAL/MENTAL HEALTH CLINIC | Age: 33
End: 2023-01-17
Payer: COMMERCIAL

## 2023-01-17 DIAGNOSIS — F41.1 GAD (GENERALIZED ANXIETY DISORDER): ICD-10-CM

## 2023-01-17 DIAGNOSIS — F43.21 COMPLICATED GRIEF: ICD-10-CM

## 2023-01-17 DIAGNOSIS — F39 MOOD DISORDER (HCC): ICD-10-CM

## 2023-01-17 DIAGNOSIS — F90.9 ATTENTION DEFICIT HYPERACTIVITY DISORDER (ADHD), UNSPECIFIED ADHD TYPE: Primary | ICD-10-CM

## 2023-01-17 PROCEDURE — 90837 PSYTX W PT 60 MINUTES: CPT | Performed by: SOCIAL WORKER

## 2023-01-17 NOTE — PROGRESS NOTES
History of Present Illness: Mandie Wallace is a 34 y.o. female who presents with symptoms of depression, anxiety and grief, ADHD     Duration of session: 64  min     Mental Status exam:           Sensorium  oriented to time, place and person   Relations cooperative   Appearance:  age appropriate   Motor Behavior:  restless and within normal limits   Speech:  normal pitch and normal volume   Thought Process: goal directed   Thought Content free of delusions, free of hallucinations and not internally preoccupied    Suicidal ideations none   Homicidal ideations none   Mood:  anxious and stable   Affect:  anxious, full range, stable and mood-congruent   Memory recent  adequate   Memory remote:  adequate   Concentration:  impaired   Abstraction:  abstract   Insight:  good   Reliability good   Judgment:  good            DIAGNOSIS AND IMPRESSION:        Axis I: mood d/o, r/o mdd, adhd, grief, BETTE  Axis II: No diagnosis  Axis III: History reviewed. No pertinent past medical history. Axis IV: Problems with primary support group, Occupational problems and Other psychosocial or environmental problems  Axis V:  61-70 mild symptoms        Strengths: humor, smart, hard working  Trauma: mom mdd, poor functioning when cl in childhood     Client presents via doxy vv for 2nd appt, first appt was about a year ago. Reports thought patterns and disruption in functioning consistent with ADHD. Reports psychiatrist/therapist will no longer prescribe adderall as cl no longer on bc. Expressed dissatisfaction with provider. Has appt with new psychiatrist and follow up with this provider next week. Encouraged cl to walk 20 minutes in the morning.        Mandie Wallace (: 1990) is a 34 y.o. female, established patient, here for evaluation of the following chief complaint(s):   Follow-up and Psychotherapy (Mych[facility] 2)       ASSESSMENT/PLAN:  Below is the assessment and plan developed based on review of pertinent history, labs, studies, and medications. 1. Attention deficit hyperactivity disorder (ADHD), unspecified ADHD type  2. Complicated grief  3. Mood disorder ([facility] [address]  4. BETTE (generalized anxiety disorder)      Return in about 1 week (around 1/24/2023). Genny Carter, was evaluated through a synchronous (real-time) audio-video encounter. The patient (or guardian if applicable) is aware that this is a billable service, which includes applicable co-pays. This Virtual Visit was conducted with patient's (and/or legal guardian's) consent. The visit was conducted pursuant to the emergency declaration under the [address], [address] [place] [address] authority and the [employer] General Act. Patient identification was verified, and a caregiver was present when appropriate. The patient was located at: Home: [address] [id number] [facility] [address]  The provider was located at: [address] was used to authenticate this note.   -- Gilmore Goltz, LCSW

## 2023-01-18 ENCOUNTER — OFFICE VISIT (OUTPATIENT)
Dept: INTERNAL MEDICINE CLINIC | Age: 33
End: 2023-01-18
Payer: COMMERCIAL

## 2023-01-18 VITALS
HEIGHT: 67 IN | DIASTOLIC BLOOD PRESSURE: 73 MMHG | HEART RATE: 114 BPM | WEIGHT: 200 LBS | SYSTOLIC BLOOD PRESSURE: 119 MMHG | TEMPERATURE: 98.1 F | OXYGEN SATURATION: 98 % | BODY MASS INDEX: 31.39 KG/M2 | RESPIRATION RATE: 18 BRPM

## 2023-01-18 DIAGNOSIS — M54.50 ACUTE LEFT-SIDED LOW BACK PAIN WITHOUT SCIATICA: Primary | ICD-10-CM

## 2023-01-18 PROCEDURE — 99213 OFFICE O/P EST LOW 20 MIN: CPT | Performed by: INTERNAL MEDICINE

## 2023-01-18 RX ORDER — CYCLOBENZAPRINE HCL 10 MG
10 TABLET ORAL
Qty: 21 TABLET | Refills: 0 | Status: SHIPPED | OUTPATIENT
Start: 2023-01-18

## 2023-01-18 NOTE — PROGRESS NOTES
Kevin Bhandari is a 34 y.o. female  HIPAA verified by two patient identifiers. Health Maintenance Due   Topic    Hepatitis C Screening     DTaP/Tdap/Td series (1 - Tdap)    COVID-19 Vaccine (4 - Booster for [employer] series)     Chief Complaint   Patient presents with    Back Pain     Visit Vitals  /73   Pulse (!) 114   Temp 98.1 °F (36.7 °C) (Temporal)   Resp 18   Ht 5' 7\" (1.702 m)   Wt 200 lb (90.7 kg)   SpO2 98%   BMI 31.32 kg/m²       Pain Scale: 2/10  Pain Location: Back (lower left)  1. Have you been to the ER, urgent care clinic since your last visit? Hospitalized since your last visit? No    2. Have you seen or consulted any other health care providers outside of the [address] [place] [address] since your last visit? Include any pap smears or colon screening.  No

## 2023-01-18 NOTE — PROGRESS NOTES
Lewis Manzo is a 34 y.o. female and presents with Back Pain    . Subjective:    Pt  w c/o low back pain x ~3mths. Pt has tried changing her mattress/ergonomic chair/tylenol/ibuprofen/heat to area w conflicting results. Pain is worse in the mornings. No radiation of pain. No bowel or bladder dysfxn. No le weakness. Pain is better over the last few days. She has been using a pillow as lumbar support  Pt works from from home. Lab Results   Component Value Date/Time    VITAMIN D, 25-HYDROXY 27.6 (L) 2022 10:58 AM       PMH- ADD- Dr. Dayami Moore- psychiatrist   -pt has been taking extra short acting adderall bc she has not received her long acting as yet     Allergic rhinitis   Vitamin d def  SH-    -pt has an IUD in place    FH- mother alive HTN, obesity, depression, hypothyroidism   Father  2020 cholangiocarcinoma,  HTN, obesity   1 brother GERD    HM  Immunizations  Eye care  Dental care  Pap UTD        Review of Systems  Review of systems (12) negative, except noted above. No past medical history on file. Past Surgical History:   Procedure Laterality Date    HX WISDOM TEETH EXTRACTION       Social History     Socioeconomic History    Marital status:    Tobacco Use    Smoking status: Former    Smokeless tobacco: Never   Vaping Use    Vaping Use: Never used   Substance and Sexual Activity    Alcohol use: Yes    Drug use: Never    Sexual activity: Yes     Partners: Male     Birth control/protection: I.U.D. Social Determinants of Health     Financial Resource Strain: Low Risk     Difficulty of Paying Living Expenses: Not hard at all   Food Insecurity: No Food Insecurity    Worried About Running Out of Food in the Last Year: Never true    Ran Out of Food in the Last Year: Never true     No family history on file.     Allergies   Allergen Reactions    Oxycodone Nausea Only and Nausea and Vomiting    Codeine Nausea Only       Objective:  Visit Vitals  /73   Pulse (!) 114   Temp 98.1 °F (36.7 °C) (Temporal)   Resp 18   Ht 5' 7\" (1.702 m)   Wt 200 lb (90.7 kg)   SpO2 98%   BMI 31.32 kg/m²     Physical Exam:   General appearance - alert, well appearing, and in no distress   Mental status - alert, oriented to person, place, and time  EYE-EOMI  Neck - supple,   Chest - symmetric air entry    Back- no vertebral point tenderness   +straight leg left neg rt straight leg  Ext-no pedal edema, no clubbing or cyanosis  Skin-Warm and dry. no hyperpigmentation, vitiligo, or suspicious lesions  Neuro -alert, oriented, normal speech, no focal findings or movement disorder noted          Results for orders placed or performed in visit on 04/04/22   PAP IG, RFX APTIMA HPV ASCUS ([id number])   Result Value Ref Range    Diagnosis Comment     Specimen adequacy Comment     Clinician provided ICD10 Comment     Performed by: Comment     . Iza Rider Note: Comment     Test methodology Comment     . Comment        Assessment/Plan:  No diagnosis found. Orders Placed This Encounter    prenatal multivit-ca-min-fe-fa tab     Sig: Take  by mouth. 1. Acute left-sided low back pain without sciatica  ? Strain  - cyclobenzaprine (FLEXERIL) 10 mg tablet; Take 1 Tablet by mouth three (3) times daily as needed for Muscle Spasm(s). Dispense: 21 Tablet; Refill: 0  - REFERRAL TO PHYSICAL THERAPY        There are no Patient Instructions on file for this visit. I have reviewed with the patient details of the assessment and plan and all questions were answered. Relevent patient education was performed. The most recent lab findings were reviewed with the patient. An After Visit Summary was printed and given to the patient.       Eric Ramirez MD

## 2023-01-19 RX ORDER — BUSPIRONE HYDROCHLORIDE 5 MG/1
5 TABLET ORAL
Qty: 90 TABLET | Refills: 2 | Status: SHIPPED | OUTPATIENT
Start: 2023-01-19

## 2023-01-24 ENCOUNTER — VIRTUAL VISIT (OUTPATIENT)
Dept: BEHAVIORAL/MENTAL HEALTH CLINIC | Age: 33
End: 2023-01-24
Payer: COMMERCIAL

## 2023-01-24 DIAGNOSIS — F90.9 ATTENTION DEFICIT HYPERACTIVITY DISORDER (ADHD), UNSPECIFIED ADHD TYPE: Primary | ICD-10-CM

## 2023-01-24 DIAGNOSIS — F43.21 COMPLICATED GRIEF: ICD-10-CM

## 2023-01-24 DIAGNOSIS — F39 MOOD DISORDER (HCC): ICD-10-CM

## 2023-01-24 DIAGNOSIS — F41.1 GAD (GENERALIZED ANXIETY DISORDER): ICD-10-CM

## 2023-01-24 PROCEDURE — 90837 PSYTX W PT 60 MINUTES: CPT | Performed by: SOCIAL WORKER

## 2023-01-24 NOTE — PROGRESS NOTES
History of Present Illness: Richa Franco is a 34 y.o. female who presents with symptoms of depression, anxiety and grief, ADHD     Duration of session: 58  min     Mental Status exam:           Sensorium  oriented to time, place and person   Relations cooperative   Appearance:  age appropriate   Motor Behavior:  restless and within normal limits   Speech:  normal pitch and normal volume   Thought Process: goal directed   Thought Content free of delusions, free of hallucinations and not internally preoccupied    Suicidal ideations none   Homicidal ideations none   Mood:  anxious and stable   Affect:  anxious, full range, stable and mood-congruent   Memory recent  adequate   Memory remote:  adequate   Concentration:  impaired   Abstraction:  abstract   Insight:  good   Reliability good   Judgment:  good            DIAGNOSIS AND IMPRESSION:        Axis I: mood d/o, r/o mdd, adhd, grief, BETTE  Axis II: No diagnosis  Axis III: History reviewed. No pertinent past medical history. Axis IV: Problems with primary support group, Occupational problems and Other psychosocial or environmental problems  Axis V:  61-70 mild symptoms        Strengths: humor, smart, hard working  Trauma: mom mdd, poor functioning when cl in childhood     Client presents via doxy vv for follow up. Presents in better space this session, more calm, less anxious and stressed. Processed feelings of pressure in regards to getting pregnant. Has been making good progress in walking. Follow up next week. Richa Franco (: 1990) is a 34 y.o. female, established patient, here for evaluation of the following chief complaint(s):   Follow-up and Psychotherapy (doxy)       ASSESSMENT/PLAN:  Below is the assessment and plan developed based on review of pertinent history, labs, studies, and medications. 1. Attention deficit hyperactivity disorder (ADHD), unspecified ADHD type  2. Complicated grief  3. Mood disorder ([address]  4.  BETTE (generalized anxiety disorder)      Return in about 1 week (around 1/31/2023). Erika Pyle, was evaluated through a synchronous (real-time) audio-video encounter. The patient (or guardian if applicable) is aware that this is a billable service, which includes applicable co-pays. This Virtual Visit was conducted with patient's (and/or legal guardian's) consent. The visit was conducted pursuant to the emergency declaration under the [address] [place] [address], [address] [place] [address] authority and the [employer] General Act. Patient identification was verified, and a caregiver was present when appropriate. The patient was located at: Home: [address] [address] [facility] [address]  The provider was located at: [facility] [address] [facility] was used to authenticate this note.   -- Juan Pablo Lind LCSW

## 2023-01-30 ENCOUNTER — HOSPITAL ENCOUNTER (OUTPATIENT)
Dept: PHYSICAL THERAPY | Age: 33
Discharge: HOME | End: 2023-01-30
Payer: COMMERCIAL

## 2023-01-30 PROCEDURE — 97161 PT EVAL LOW COMPLEX 20 MIN: CPT
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
## 2023-01-31 DIAGNOSIS — E04.1 NONTOXIC UNINODULAR GOITER: Primary | ICD-10-CM

## 2023-02-01 ENCOUNTER — VIRTUAL VISIT (OUTPATIENT)
Dept: SOCIAL WORK | Age: 33
End: 2023-02-01
Payer: COMMERCIAL

## 2023-02-01 ENCOUNTER — HOSPITAL ENCOUNTER (OUTPATIENT)
Dept: PHYSICAL THERAPY | Age: 33
Discharge: HOME | End: 2023-02-01
Payer: COMMERCIAL

## 2023-02-01 DIAGNOSIS — F41.1 GAD (GENERALIZED ANXIETY DISORDER): ICD-10-CM

## 2023-02-01 DIAGNOSIS — F39 MOOD DISORDER (HCC): Primary | ICD-10-CM

## 2023-02-01 DIAGNOSIS — F90.9 ATTENTION DEFICIT HYPERACTIVITY DISORDER (ADHD), UNSPECIFIED ADHD TYPE: ICD-10-CM

## 2023-02-01 DIAGNOSIS — F43.21 COMPLICATED GRIEF: ICD-10-CM

## 2023-02-01 PROCEDURE — 97112 NEUROMUSCULAR REEDUCATION: CPT

## 2023-02-01 PROCEDURE — 90837 PSYTX W PT 60 MINUTES: CPT | Performed by: SOCIAL WORKER

## 2023-02-01 PROCEDURE — 97140 MANUAL THERAPY 1/> REGIONS: CPT

## 2023-02-01 PROCEDURE — 97110 THERAPEUTIC EXERCISES: CPT

## 2023-02-01 NOTE — PROGRESS NOTES
PT DAILY TREATMENT NOTE - [facility] 2-15    Patient Name: Darylene Hoyle  Date:2023  : 1990  [x]  Patient  Verified  Payor: Meghna Martin / Plan: Sree Mercado / Product Type: HMO /    In time: 8078  Out time: 1158  Total Treatment Time (min): 55  Total Timed Codes (min): 55  1:1 Treatment Time ( only): 55   Visit #:  2    Treatment Area: Lower back pain [M54.50]    SUBJECTIVE  Pain Level (0-10 scale): 2/10  Any medication changes, allergies to medications, adverse drug reactions, diagnosis change, or new procedure performed?: [x] No    [] Yes (see summary sheet for update)  Subjective functional status/changes:   [] No changes reported  I forgot to do my homework yesterday but did do them this morning. OBJECTIVE    10 min Therapeutic Exercise:  [] See flow sheet :   Rationale: increase ROM and increase strength to improve the patients ability to walk without pain    35 min Neuromuscular Re-education:  [x]  See flow sheet : glut squeeze with tactile cues, continuous tactile cues through TA activation in supine,    Rationale: increase ROM, increase strength, improve coordination, and increase proprioception  to improve the patients ability to sit with decreased pain     10 min Manual Therapy: S/L QL stretch, hooklying lumbar traction (guarding throughout),     Rationale: decrease pain, increase ROM, increase tissue extensibility, and decrease trigger points to improve the patients ability to sit without pain         With   [] TE   [] TA   [] Neuro   [] SC   [] other: Patient Education: [x] Review HEP    [] Progressed/Changed HEP based on:   [] positioning   [] body mechanics   [] transfers   [] heat/ice application    [] other:      Other Objective/Functional Measures:      Pain Level (0-10 scale) post treatment: 1/10    ASSESSMENT/Changes in Function:   Patient was significantly challenged with glut and TA activation this date and required frequent cueing and fatigued quickly.    Patient will continue to benefit from skilled PT services to modify and progress therapeutic interventions, address functional mobility deficits, address ROM deficits, address strength deficits, analyze and address soft tissue restrictions, analyze and cue movement patterns, analyze and modify body mechanics/ergonomics, and assess and modify postural abnormalities to attain remaining goals. [x]  See Plan of Care  []  See progress note/recertification  []  See Discharge Summary         Progress towards goals / Updated goals:  Short Term Goals: To be accomplished in 4-6 treatments:  1) Pt will be independent in progressive HEP. Long Term Goals: To be accomplished in 10-12 treatments:  1) Pt will be able to sit greater than 90 minutes without increased pain at work to better engage and perform vocational tasks. 2) Pt will be able to drive greater than 30 minutes without increase of pain  3) Pt will be able to rise supine to  the morning 4 days in a row without report of pain. 4) Pt will be able to retrieve item from ground without pain and with good form consistently during treatment session. 5) Pt will be able to demonstrate improvement in FOTO score >=70/100 to indicate improvement in functional mobility. 6) Pt will be independent in progressive HEP.                   PLAN  [x]  Upgrade activities as tolerated     [x]  Continue plan of care  [x]  Update interventions per flow sheet       []  Discharge due to:_  []  Other:_      Wali Burch, PT 2/1/2023

## 2023-02-02 NOTE — PROGRESS NOTES
History of Present Illness: Tho Veloz is a 34 y.o. female who presents with symptoms of depression, anxiety and grief, ADHD     Duration of session: 61  min     Mental Status exam:           Sensorium  oriented to time, place and person   Relations cooperative   Appearance:  age appropriate   Motor Behavior:  restless and within normal limits   Speech:  normal pitch and normal volume   Thought Process: goal directed   Thought Content free of delusions, free of hallucinations and not internally preoccupied    Suicidal ideations none   Homicidal ideations none   Mood:  anxious and stable   Affect:  anxious, full range, stable and mood-congruent   Memory recent  adequate   Memory remote:  adequate   Concentration:  impaired   Abstraction:  abstract   Insight:  good   Reliability good   Judgment:  good            DIAGNOSIS AND IMPRESSION:        Axis I: mood d/o, r/o mdd, adhd, grief, BETTE  Axis II: No diagnosis  Axis III: History reviewed. No pertinent past medical history. Axis IV: Problems with primary support group, Occupational problems and Other psychosocial or environmental problems  Axis V:  61-70 mild symptoms        Strengths: humor, smart, hard working  Trauma: mom mdd, poor functioning when cl in childhood     Client presents via doxy vv for follow up.  cl expressed feeling overwhelmed to the point of freezing with work.  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 Processed feelings of disappointment and frustration with father for never acknowledging and preparing for his death, towards mother for her inability to function in more of a support role. Big grief response. Continue to process. Follow up next week.          Tho Veloz (: 1990) is a 34 y.o. female, established patient, here for evaluation of the following chief complaint(s):   Follow-up and Psychotherapy (doxy)       ASSESSMENT/PLAN:  Below is the assessment and plan developed based on review of pertinent history, labs, studies, and medications. 1. Mood disorder ([address]  2. BETTE (generalized anxiety disorder)  3. Complicated grief  4. Attention deficit hyperactivity disorder (ADHD), unspecified ADHD type      Return in about 1 week (around 2/8/2023). Priscilla France, was evaluated through a synchronous (real-time) audio-video encounter. The patient (or guardian if applicable) is aware that this is a billable service, which includes applicable co-pays. This Virtual Visit was conducted with patient's (and/or legal guardian's) consent. The visit was conducted pursuant to the emergency declaration under the [facility], [address] [place] [address] authority and the [employer] General Act. Patient identification was verified, and a caregiver was present when appropriate. The patient was located at: Home: [address] [address] [facility] [address]  The provider was located at: [facility]: [address] [facility] was used to authenticate this note.   -- Robert Malin LCSW

## 2023-02-03 DIAGNOSIS — E04.1 NONTOXIC UNINODULAR GOITER: Primary | ICD-10-CM

## 2023-02-06 ENCOUNTER — HOSPITAL ENCOUNTER (OUTPATIENT)
Dept: PHYSICAL THERAPY | Age: 33
Discharge: HOME | End: 2023-02-06
Payer: COMMERCIAL

## 2023-02-06 PROCEDURE — 97110 THERAPEUTIC EXERCISES: CPT

## 2023-02-06 PROCEDURE — 97112 NEUROMUSCULAR REEDUCATION: CPT

## 2023-02-06 NOTE — PROGRESS NOTES
PT DAILY TREATMENT NOTE - [facility] 2-15    Patient Name: Marck Long  Date:2023  : 1990  [x]  Patient  Verified  Payor: Uriah Fines / Plan: Shira Phillipsty / Product Type: HMO /    In time: 4:52p  Out time: 5:58p  Total Treatment Time (min): 66  Total Timed Codes (min): 59  1:1 Treatment Time (MC only): 48   Visit #:  3    Treatment Area: Lower back pain [M54.50]    SUBJECTIVE  Pain Level (0-10 scale): 3/10  Any medication changes, allergies to medications, adverse drug reactions, diagnosis change, or new procedure performed?: [x] No    [] Yes (see summary sheet for update)  Subjective functional status/changes:   [] No changes reported  I switched my mattress and had to do a lot of deep cleaning over the weekend and that might be why.      OBJECTIVE    Modality rationale: decrease pain and increase tissue extensibility to improve the patients ability to work with decreased pain   Min Type Additional Details       [] Estim: []Att   []Unatt    []TENS instruct                  []IFC  []Premod   []NMES                     []Other:  []w/US   []w/ice   []w/heat  Position:  Location:       []  Traction: [] Cervical       []Lumbar                       [] Prone          []Supine                       []Intermittent   []Continuous Lbs:  [] before manual  [] after manual  []w/heat    []  Ultrasound: []Continuous   [] Pulsed                       at: []1MHz   []3MHz Location:  W/cm2:    [] Paraffin         Location:   []w/heat   6 []  Ice     [x]  Heat  []  Ice massage Position: supine  Location: lumbar    []  Laser  []  Other: Position:  Location:      []  Vasopneumatic Device Pressure:       [] lo [] med [] hi   Temperature:      [x] Skin assessment post-treatment:  [x]intact []redness- no adverse reaction    []redness - adverse reaction:     20 min Therapeutic Exercise:  [x] See flow sheet :   Rationale: increase ROM and increase strength to improve the patients ability to walk with decreased pain    39 min Neuromuscular Re-education:  [x]  See flow sheet : glut squeeze, TA activation in supine, constant tactile ceuing throughout ball on wall, TA + shoulder ext,    Rationale: increase ROM, increase strength, improve coordination, and increase proprioception  to improve the patients ability to squat with decreased pain        With   [] TE   [] TA   [] Neuro   [] SC   [] other: Patient Education: [x] Review HEP    [] Progressed/Changed HEP based on:   [] positioning   [] body mechanics   [] transfers   [] heat/ice application    [] other:      Other Objective/Functional Measures:      Pain Level (0-10 scale) post treatment: 2/10    ASSESSMENT/Changes in Function:   Patient continues to be challenged with hip and core activation but was able to noticed appropriate contraction by end of session. Continues to fatigue quickly with hip strengthening with lumbar compensation noted but does well with frequent breaks. Patient will continue to benefit from skilled PT services to modify and progress therapeutic interventions, address functional mobility deficits, address ROM deficits, address strength deficits, analyze and address soft tissue restrictions, analyze and cue movement patterns, analyze and modify body mechanics/ergonomics, and assess and modify postural abnormalities to attain remaining goals. [x]  See Plan of Care  []  See progress note/recertification  []  See Discharge Summary         Progress towards goals / Updated goals:  1) Pt will be independent in progressive HEP. Long Term Goals: To be accomplished in 10-12 treatments:  1) Pt will be able to sit greater than 90 minutes without increased pain at work to better engage and perform vocational tasks. 2) Pt will be able to drive greater than 30 minutes without increase of pain  3) Pt will be able to rise supine to  the morning 4 days in a row without report of pain.    4) Pt will be able to retrieve item from ground without pain and with good form consistently during treatment session. 5) Pt will be able to demonstrate improvement in FOTO score >=70/100 to indicate improvement in functional mobility. 6) Pt will be independent in progressive HEP.                   PLAN  [x]  Upgrade activities as tolerated     [x]  Continue plan of care  [x]  Update interventions per flow sheet       []  Discharge due to:_  []  Other:_      Harvey Abernathy, PT 2/6/2023

## 2023-02-07 ENCOUNTER — OFFICE VISIT (OUTPATIENT)
Dept: BEHAVIORAL/MENTAL HEALTH CLINIC | Age: 33
End: 2023-02-07
Payer: COMMERCIAL

## 2023-02-07 VITALS
WEIGHT: 210 LBS | HEART RATE: 106 BPM | BODY MASS INDEX: 31.83 KG/M2 | DIASTOLIC BLOOD PRESSURE: 71 MMHG | SYSTOLIC BLOOD PRESSURE: 107 MMHG | OXYGEN SATURATION: 98 % | HEIGHT: 68 IN | RESPIRATION RATE: 17 BRPM | TEMPERATURE: 98.1 F

## 2023-02-07 DIAGNOSIS — F43.21 COMPLICATED GRIEF: ICD-10-CM

## 2023-02-07 DIAGNOSIS — F41.1 GAD (GENERALIZED ANXIETY DISORDER): ICD-10-CM

## 2023-02-07 DIAGNOSIS — F39 MOOD DISORDER (HCC): Primary | ICD-10-CM

## 2023-02-07 DIAGNOSIS — F90.9 ATTENTION DEFICIT HYPERACTIVITY DISORDER (ADHD), UNSPECIFIED ADHD TYPE: ICD-10-CM

## 2023-02-07 PROCEDURE — 99204 OFFICE O/P NEW MOD 45 MIN: CPT | Performed by: NURSE PRACTITIONER

## 2023-02-07 RX ORDER — QUETIAPINE FUMARATE 100 MG/1
100 TABLET, FILM COATED ORAL
Qty: 30 TABLET | Refills: 2 | Status: SHIPPED | OUTPATIENT
Start: 2023-02-07

## 2023-02-07 RX ORDER — DEXTROAMPHETAMINE SACCHARATE, AMPHETAMINE ASPARTATE MONOHYDRATE, DEXTROAMPHETAMINE SULFATE AND AMPHETAMINE SULFATE 3.75; 3.75; 3.75; 3.75 MG/1; MG/1; MG/1; MG/1
15 CAPSULE, EXTENDED RELEASE ORAL
COMMUNITY

## 2023-02-07 NOTE — PROGRESS NOTES
Chief Complaint   Patient presents with    New Patient     ADHD     Visit Vitals  /71 (BP 1 Location: Left upper arm, BP Patient Position: Sitting, BP Cuff Size: Adult)   Pulse (!) 106   Temp 98.1 °F (36.7 °C) (Oral)   Resp 17   Ht 5' 8\" (1.727 m)   Wt 95.3 kg (210 lb)   SpO2 98%   BMI 31.93 kg/m²     Prior to Admission medications    Medication Sig Start Date End Date Taking? Authorizing Provider   amphetamine-dextroamphetamine XR (Adderall XR) 15 mg XR capsule Take 15 mg by mouth every morning. Yes Provider, Historical   busPIRone (BUSPAR) 5 mg tablet Take 1 Tablet by mouth three (3) times daily as needed for PRN Reason (Other) (anxiety). Indications: repeated episodes of anxiety 1/72/37  Yes Shannon Skinner MD   prenatal multivit-ca-min-fe-fa tab Take  by mouth. Yes Provider, Historical   cyclobenzaprine (FLEXERIL) 10 mg tablet Take 1 Tablet by mouth three (3) times daily as needed for Muscle Spasm(s). 1/18/23  Yes Ashley Javed MD   sodium fluoride-pot nitrate 1.1-5 % pste  3/13/22  Yes Provider, Historical   fexofenadine (ALLEGRA) 180 mg tablet Take 180 mg by mouth daily as needed. Yes Provider, Historical   QUEtiapine (SEROquel) 25 mg tablet quetiapine 25 mg tablet   Yes Provider, Historical   dextroamphetamine-amphetamine (ADDERALL) 10 mg tablet dextroamphetamine-amphetamine 10 mg tablet   Yes Provider, Historical   spironolactone (ALDACTONE) 50 mg tablet Take 50 mg by mouth two (2) times a day. Patient not taking: Reported on 2/7/2023    Provider, Historical   melatonin 5 mg cap capsule Take 5 mg by mouth nightly.   Patient not taking: Reported on 2/7/2023 8/1/20   Provider, Historical     PHQ 9 Score: 18 (2/7/2023  9:11 AM)  Thoughts of being better off dead, or hurting yourself in some way: 1 (2/7/2023  9:11 AM)

## 2023-02-07 NOTE — PROGRESS NOTES
INITIAL EVALUATION    CHIEF COMPLAINT:  Brittny Parker is a 34 y.o. female and was seen today to establish psychiatric care. \"I was been seen by another psychiatrist\"    HPI:    Monae Ponce reports the following psychiatric symptoms:  depression, anxiety, and impaired concentration . The symptoms have been present for years and are of moderate severity. The symptoms occur constantly. Has a history, of ADHD,BETTE, and Mood disorder. Reports she had one manic episode in 2015 when she was on Wellbutrin. Associated symptoms include  agitation, increased irritability, poor concentration, and stressed at work. Was referred by Dr. Jay Springer for depression and ADHD. She is currently on Seroquel 25 mg, Buspar 5 mg TID, Adderall 15 mg XR and Adderall 10 mg IR. Patient states symptoms date back to childhood. She has had multiple providers. She denies substance use, admissions, and SA. She reports having some \"passive suicidal thoughts, but denies any currently. She denies precipitating and alleviating factors. She is here to establish care with this provider. PAST HISTORY:  Psychiatric:  Past Psychiatric Hospitalization:  denies   Past Outpatient Providers:  was Seeing Dr. Tessa Cole [facility] Saw Dr. Virginia Gibson   Past Psychiatric Medications: Wellbutrin (jennifer) , Zoloft (apathy) , Prozac (apathy), Lexapro 2022 (hypersomnia) ,     PHQ-9 score is  PHQ 9 Score: 18 , indicating moderately severe  Depression . HAM-A score is  ,29  indicating severe Anxiety  Mood Disorder Questionnaire is Document results of questions A, B, & C: Question (a) is positive with 7 or more sub-questions answered \"Yes. \", Question (b) is positive with an answer of \"Yes. \", Question (c) is positive with an answer of \"Moderate Problem\" or \"Serious Problem\" .     3 most recent PHQ Screens 2/7/2023   Little interest or pleasure in doing things More than half the days   Feeling down, depressed, irritable, or hopeless More than half the days Total Score PHQ 2 4   Trouble falling or staying asleep, or sleeping too much Nearly every day   Feeling tired or having little energy Nearly every day   Poor appetite, weight loss, or overeating More than half the days   Feeling bad about yourself - or that you are a failure or have let yourself or your family down Nearly every day   Trouble concentrating on things such as school, work, reading, or watching TV More than half the days   Moving or speaking so slowly that other people could have noticed; or the opposite being so fidgety that others notice Not at all   Thoughts of being better off dead, or hurting yourself in some way Several days   PHQ 9 Score 18   How difficult have these problems made it for you to do your work, take care of your home and get along with others Very difficult        Medical:  Active Ambulatory Problems     Diagnosis Date Noted    Establishing care with new doctor, encounter for 03/02/2022    Attention deficit hyperactivity disorder (ADHD), unspecified ADHD type 15/20/4596    Complicated grief 26/92/6513    Mood disorder ([facility] [address] 03/17/2022    BETTE (generalized anxiety disorder) 01/17/2023     Resolved Ambulatory Problems     Diagnosis Date Noted    No Resolved Ambulatory Problems     No Additional Past Medical History     Substance Use:   Social History     Socioeconomic History    Marital status:    Tobacco Use    Smoking status: Former    Smokeless tobacco: Never   Vaping Use    Vaping Use: Never used   Substance and Sexual Activity    Alcohol use: Yes    Drug use: Never    Sexual activity: Yes     Partners: Male     Birth control/protection: I.U.D.      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 Reports some emotional trauma associated with mother's diagnosis of depression. Has a good supportive life. Family:  Family history of mental, medical or substance use history reported: Mother hx of depression      MEDICATIONS:  Current Outpatient Medications   Medication Sig Dispense Refill    amphetamine-dextroamphetamine XR (Adderall XR) 15 mg XR capsule Take 15 mg by mouth every morning. QUEtiapine (SEROquel) 100 mg tablet Take 1 Tablet by mouth nightly. 30 Tablet 2    busPIRone (BUSPAR) 5 mg tablet Take 1 Tablet by mouth three (3) times daily as needed for PRN Reason (Other) (anxiety). Indications: repeated episodes of anxiety 90 Tablet 2    prenatal multivit-ca-min-fe-fa tab Take  by mouth. cyclobenzaprine (FLEXERIL) 10 mg tablet Take 1 Tablet by mouth three (3) times daily as needed for Muscle Spasm(s). 21 Tablet 0    sodium fluoride-pot nitrate 1.1-5 % pste       fexofenadine (ALLEGRA) 180 mg tablet Take 180 mg by mouth daily as needed. dextroamphetamine-amphetamine (ADDERALL) 10 mg tablet dextroamphetamine-amphetamine 10 mg tablet      spironolactone (ALDACTONE) 50 mg tablet Take 50 mg by mouth two (2) times a day. (Patient not taking: Reported on 2/7/2023)      melatonin 5 mg cap capsule Take 5 mg by mouth nightly. (Patient not taking: Reported on 2/7/2023)         ALLERGIES:  Allergies   Allergen Reactions    Oxycodone Nausea Only and Nausea and Vomiting    Codeine Nausea Only       REVIEW OF SYSTEMS:  Psychiatric:  ADHD, Mood disorder, BETTE   Appetite:no change from normal   Sleep: poor with DMS (maintaining sleep)   Pt reports the following:  denies   All other systems reviewed and are as noted above.     MENTAL STATUS EXAM:     Orientation oriented to time, place and person   Vital Signs (BP,Pulse, Temp) See below (reviewed)   Gait and Station Within normal limits   Abnormal Muscular Movements/Tone/Behavior No EPS, no Tardive Dyskinesia, no abnormal muscular movements; wnl tone   Relations cooperative   General Appearance:  age appropriate and within normal Limits   Language No aphasia or dysarthria   Speech:  hyperverbal   Thought Processes logical, wnl rate of thoughts, good abstract reasoning and computation   Thought Associations within normal limits   Thought Content free of delusions and free of hallucinations   Suicidal Ideations none   Homicidal Ideations none   Mood:  depressed   Affect:  anxious   Memory recent  adequate   Memory remote:  adequate   Concentration/Attention:  impaired   Fund of Knowledge Fair/average   Insight:  fair and good   Reliability good   Judgment:  good     VITALS:     Visit Vitals  /71 (BP 1 Location: Left upper arm, BP Patient Position: Sitting, BP Cuff Size: Adult)   Pulse (!) 106   Temp 98.1 °F (36.7 °C) (Oral)   Resp 17   Ht 5' 8\" (1.727 m)   Wt 95.3 kg (210 lb)   SpO2 98%   BMI 31.93 kg/m²       PERTINENT DATA:  No visits with results within 2 Day(s) from this visit. Latest known visit with results is:   Office Visit on 04/04/2022   Component Date Value Ref Range Status    Diagnosis 04/04/2022 Comment   Final    Specimen adequacy 04/04/2022 Comment   Final    Clinician provided ICD10 04/04/2022 Comment   Final    Performed by: 04/04/2022 Comment   Final    . 04/04/2022 . Final    Note: 04/04/2022 Comment   Final    Test methodology 04/04/2022 Comment   Final    . 04/04/2022 Comment   Final       XR Results (most recent):  No results found for this or any previous visit. MEDICAL DECISION MAKING:  Problems addressed today:     ICD-10-CM ICD-9-CM    1. Mood disorder (HCC)  F39 296.90 QUEtiapine (SEROquel) 100 mg tablet      2.  Attention deficit hyperactivity disorder (ADHD), unspecified ADHD type  F90.9 314.01 REFERRAL TO NEUROPSYCHOLOGY      3. BETTE (generalized anxiety disorder)  F41.1 300.02       4. Complicated grief  Y41.37 309.0         DD: Bipolar I Disorder     Assessment:   Justus Hamilton is a 34 y.o. female and presents with depression, impaired concentration and anxiety, to face to face apt to establish care with this provided. Patient has had a hx of depression and anxiety since late teens early twenties. She has a hx of ADHD as an adult. Reviewed past medical, psych, and social hx. Discussed diagnosis and DD. Patient appears to be hypomanic, but reports she has always talked a lot and fast. States when she is passionate about something she speaks loudly. Will increase Seroquel. Will need to rule out Bipolar I Disorder. Educated patient the importance of treating Depression. Ordered Neuropsych eval. Will restart Adderall once depression is better managed. Will increase Buspar at next apt if anxiety continues to be exacerbated. Risks vs benefits discussed. Reinforced positive coping strategies. Reviewed treatment goals to look monitor for. Will continue to monitor. Plan:   1. Medications        Current Outpatient Medications   Medication Sig Dispense Refill    amphetamine-dextroamphetamine XR (Adderall XR) 15 mg XR capsule Take 15 mg by mouth every morning. QUEtiapine (SEROquel) 100 mg tablet Take 1 Tablet by mouth nightly. 30 Tablet 2    busPIRone (BUSPAR) 5 mg tablet Take 1 Tablet by mouth three (3) times daily as needed for PRN Reason (Other) (anxiety). Indications: repeated episodes of anxiety 90 Tablet 2    prenatal multivit-ca-min-fe-fa tab Take  by mouth. cyclobenzaprine (FLEXERIL) 10 mg tablet Take 1 Tablet by mouth three (3) times daily as needed for Muscle Spasm(s). 21 Tablet 0    sodium fluoride-pot nitrate 1.1-5 % pste       fexofenadine (ALLEGRA) 180 mg tablet Take 180 mg by mouth daily as needed.       dextroamphetamine-amphetamine (ADDERALL) 10 mg tablet dextroamphetamine-amphetamine 10 mg tablet      spironolactone (ALDACTONE) 50 mg tablet Take 50 mg by mouth two (2) times a day. (Patient not taking: Reported on 2/7/2023)      melatonin 5 mg cap capsule Take 5 mg by mouth nightly. (Patient not taking: Reported on 2/7/2023)           Medication changes made today: Start Seroquel 100 mg   2. Counseling and coordination of care including instructions for treatment, risks/benefits, risk factor reduction and patient/family education. She agrees with the plan. Patient instructed to call with any side effects, questions or issues. 3. Collateral information  4. Individual therapy   5. Monitor VS and appropriate labs  6.  Request records                2/7/2023  Tracey Banda NP

## 2023-02-09 ENCOUNTER — VIRTUAL VISIT (OUTPATIENT)
Dept: SOCIAL WORK | Age: 33
End: 2023-02-09
Payer: COMMERCIAL

## 2023-02-09 DIAGNOSIS — F43.21 COMPLICATED GRIEF: ICD-10-CM

## 2023-02-09 DIAGNOSIS — F41.1 GAD (GENERALIZED ANXIETY DISORDER): ICD-10-CM

## 2023-02-09 DIAGNOSIS — F33.1 MODERATE EPISODE OF RECURRENT MAJOR DEPRESSIVE DISORDER (HCC): Primary | ICD-10-CM

## 2023-02-09 DIAGNOSIS — F90.9 ATTENTION DEFICIT HYPERACTIVITY DISORDER (ADHD), UNSPECIFIED ADHD TYPE: ICD-10-CM

## 2023-02-09 PROCEDURE — 90837 PSYTX W PT 60 MINUTES: CPT | Performed by: SOCIAL WORKER

## 2023-02-09 NOTE — PROGRESS NOTES
History of Present Illness: Brittny Parker is a 34 y.o. female who presents with symptoms of depression, anxiety and grief, ADHD     Duration of session: 57 min     Mental Status exam:           Sensorium  oriented to time, place and person   Relations cooperative   Appearance:  age appropriate   Motor Behavior:  restless and within normal limits   Speech:  normal pitch and normal volume   Thought Process: goal directed   Thought Content free of delusions, free of hallucinations and not internally preoccupied    Suicidal ideations none   Homicidal ideations none   Mood:  anxious and stable   Affect:  anxious, full range, stable and mood-congruent   Memory recent  adequate   Memory remote:  adequate   Concentration:  impaired   Abstraction:  abstract   Insight:  good   Reliability good   Judgment:  good            DIAGNOSIS AND IMPRESSION:        Axis I: mdd, adhd, grief, BETTE  Axis II: No diagnosis  Axis III: History reviewed. No pertinent past medical history. Axis IV: Problems with primary support group, Occupational problems and Other psychosocial or environmental problems  Axis V:  61-70 mild symptoms        Strengths: humor, smart, hard working  Trauma: mom mdd, poor functioning when cl in childhood     Client presents via doxy vv for follow up.  cl in depressed space, stable. Discuss possible mood d/o vs mdd and anxiety and adhd symptoms. Scheduled for neuropsych exam may 5. Maintaining some level of self care with walking, minimal exercise, social activity. Follow up next week. Brittny Parker (: 1990) is a 34 y.o. female, established patient, here for evaluation of the following chief complaint(s):   Follow-up and Psychotherapy (doxy)       ASSESSMENT/PLAN:  Below is the assessment and plan developed based on review of pertinent history, labs, studies, and medications. 1. Moderate episode of recurrent major depressive disorder ([facility] [address]  2. BETTE (generalized anxiety disorder)  3. Complicated grief  4. Attention deficit hyperactivity disorder (ADHD), unspecified ADHD type      Return in about 1 week (around 2/16/2023). Raya Cuellar, was evaluated through a synchronous (real-time) audio-video encounter. The patient (or guardian if applicable) is aware that this is a billable service, which includes applicable co-pays. This Virtual Visit was conducted with patient's (and/or legal guardian's) consent. The visit was conducted pursuant to the emergency declaration under the [facility], [address] [place] [address] authority and the [employer] and [employer] General Act. Patient identification was verified, and a caregiver was present when appropriate. The patient was located at: Home: [address] [address] [facility] [address]  The provider was located at: Home: [address] [facility] was used to authenticate this note.   -- Cheri Chery LCSW

## 2023-02-10 ENCOUNTER — TELEPHONE (OUTPATIENT)
Dept: OBGYN CLINIC | Age: 33
End: 2023-02-10

## 2023-02-10 ENCOUNTER — HOSPITAL ENCOUNTER (OUTPATIENT)
Dept: PHYSICAL THERAPY | Age: 33
Discharge: HOME | End: 2023-02-10
Payer: COMMERCIAL

## 2023-02-10 PROCEDURE — 97112 NEUROMUSCULAR REEDUCATION: CPT

## 2023-02-10 PROCEDURE — 97110 THERAPEUTIC EXERCISES: CPT

## 2023-02-10 RX ORDER — PROGESTERONE 100 MG/1
CAPSULE ORAL
Qty: 30 CAPSULE | Refills: 3 | Status: SHIPPED | OUTPATIENT
Start: 2023-02-10

## 2023-02-10 NOTE — PROGRESS NOTES
PT DAILY TREATMENT NOTE - [facility] 2-15    Patient Name: Laura Navarrete  Date:2/10/2023  : 1990  [x]  Patient  Verified  Payor: Afshin Marshall / Plan: Joaquin Miles / Product Type: HMO /    In time: 8:41A Out time: 10:01A  Total Treatment Time (min): 80  Total Timed Codes (min): 80  1:1 Treatment Time ([address] only):75  Visit #:  4    Treatment Area: Lower back pain [M54.50]    SUBJECTIVE  Pain Level (0-10 scale): 4/10  Any medication changes, allergies to medications, adverse drug reactions, diagnosis change, or new procedure performed?: [x] No    [] Yes (see summary sheet for update)  Subjective functional status/changes:   [] No changes reported  Pt reports back is hurting a little more today. Pt reported was pretty sore after last session. Had not done her PT exercises over the week due to the soreness and having a house guest.     OBJECTIVE      40 min Therapeutic Exercise:  [x] See flow sheet :   Rationale: increase ROM and increase strength to improve the patients ability to walk with decreased pain    40 min Neuromuscular Re-education:  [x]  See flow sheet : glut squeeze, TA activation in supine, constant tactile ceuing throughout ball on wall, TA + shoulder ext, core press   Rationale: increase ROM, increase strength, improve coordination, and increase proprioception  to improve the patients ability to squat with decreased pain        With   [] TE   [] TA   [] Neuro   [] SC   [] other: Patient Education: [x] Review HEP    [] Progressed/Changed HEP based on:   [] positioning   [] body mechanics   [] transfers   [] heat/ice application    [] other:      Other Objective/Functional Measures: --     Pain Level (0-10 scale) post treatment: -2/10    ASSESSMENT/Changes in Function:   Extensive review and cuing for HEP. Notes provided to pt's HEP to assist in proper form carry over outside of clinic. Incorporated yoga breathing with session today in aiding for compensatory muscles firing.  This helped significantly and pt advised to work with this at home. Pt has one more session with PT on Monday before d/c due to insurance visit limit. Patient will continue to benefit from skilled PT services to modify and progress therapeutic interventions, address functional mobility deficits, address ROM deficits, address strength deficits, analyze and address soft tissue restrictions, analyze and cue movement patterns, analyze and modify body mechanics/ergonomics, and assess and modify postural abnormalities to attain remaining goals. [x]  See Plan of Care  []  See progress note/recertification  []  See Discharge Summary         Progress towards goals / Updated goals:  1) Pt will be independent in progressive HEP. Long Term Goals: To be accomplished in 10-12 treatments:  1) Pt will be able to sit greater than 90 minutes without increased pain at work to better engage and perform vocational tasks. 2) Pt will be able to drive greater than 30 minutes without increase of pain  3) Pt will be able to rise supine to  the morning 4 days in a row without report of pain. 4) Pt will be able to retrieve item from ground without pain and with good form consistently during treatment session. 5) Pt will be able to demonstrate improvement in FOTO score >=70/100 to indicate improvement in functional mobility. 6) Pt will be independent in progressive HEP.                   PLAN  [x]  Upgrade activities as tolerated     [x]  Continue plan of care  [x]  Update interventions per flow sheet       []  Discharge due to:_  []  Other:_      Christopher Alvarez, PTA 2/10/2023

## 2023-02-10 NOTE — TELEPHONE ENCOUNTER
Pt calling name and  verified. Pt having my chart discussion with Dr. Juni Craven nurse about what she should do progesterone supplement or femara or lab work.        Spoke with Dr. Juni Craven since her 22 day cycle is Saturday going with progesterone supplement she is to take for 14 days take upt test prior to stopping     Called patient verified name and  advised of above pt verbalized understanding No

## 2023-02-13 ENCOUNTER — HOSPITAL ENCOUNTER (OUTPATIENT)
Dept: PHYSICAL THERAPY | Age: 33
Discharge: HOME | End: 2023-02-13
Payer: COMMERCIAL

## 2023-02-13 PROCEDURE — 97112 NEUROMUSCULAR REEDUCATION: CPT

## 2023-02-13 PROCEDURE — 97110 THERAPEUTIC EXERCISES: CPT

## 2023-02-13 PROCEDURE — 97535 SELF CARE MNGMENT TRAINING: CPT

## 2023-02-13 PROCEDURE — 97140 MANUAL THERAPY 1/> REGIONS: CPT
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
## 2023-02-13 NOTE — PROGRESS NOTES
PT DAILY TREATMENT NOTE - [facility] 2-15    Patient Name: Pete Clifton  Date:2023  : 1990  [x]  Patient  Verified  Payor: Brennon Vera / Plan: Eduardo Shen / Product Type: HMO /    In time: 1004  Out time: 1319  Total Treatment Time (min): 87  Total Timed Codes (min): 77  1:1 Treatment Time ( only): 77   Visit #:  5    Treatment Area: Lower back pain [M54.50]    SUBJECTIVE  Pain Level (0-10 scale): 4-5/10  Any medication changes, allergies to medications, adverse drug reactions, diagnosis change, or new procedure performed?: [x] No    [] Yes (see summary sheet for update)  Subjective functional status/changes:   [] No changes reported  I was able to do my HEP on Saturday and  but the last night and this morning the pain was getting worse again.      OBJECTIVE    Modality rationale: decrease pain and increase tissue extensibility to improve the patients ability to sleep without pain    Min Type Additional Details       [] Estim: []Att   []Unatt    []TENS instruct                  []IFC  []Premod   []NMES                     []Other:  []w/US   []w/ice   []w/heat  Position:  Location:       []  Traction: [] Cervical       []Lumbar                       [] Prone          []Supine                       []Intermittent   []Continuous Lbs:  [] before manual  [] after manual  []w/heat    []  Ultrasound: []Continuous   [] Pulsed                       at: []1MHz   []3MHz Location:  W/cm2:    [] Paraffin         Location:   []w/heat   10 [x]  Ice     []  Heat  []  Ice massage Position: mostly reclined  Location: lumbar     []  Laser  []  Other: Position:  Location:      []  Vasopneumatic Device Pressure:       [] lo [] med [] hi   Temperature:      [x] Skin assessment post-treatment:  [x]intact []redness- no adverse reaction    []redness - adverse reaction:     15 min Therapeutic Exercise:  [x] See flow sheet :   Rationale: increase ROM and increase strength to improve the patients ability to sit without pain    10 min Neuromuscular Re-education:  [x]  See flow sheet : TA activation in standing, kegels,    Rationale: increase ROM, increase strength, improve coordination, and increase proprioception  to improve the patients ability to work without pain    15 min Self Care/Home Management:  [x]  See flow sheet : sleeping positioning for minimizing sacral movements as well as neutral positioning for sexual positioning to decrease pain, adjusting work posture to help with pain   Rationale: increase ROM, increase strength, improve coordination, and increase proprioception  to improve the patients ability to sleep without pain      37 min Manual Therapy: shot gun technique, R S/L QL stretch, STM/MFR to piriformis,     Rationale: decrease pain, increase ROM, increase tissue extensibility, and decrease trigger points to improve the patients ability to sleep with decreased pain          With   [] TE   [] TA   [] Neuro   [] SC   [] other: Patient Education: [x] Review HEP    [] Progressed/Changed HEP based on:   [] positioning   [] body mechanics   [] transfers   [] heat/ice application    [] other:      Other Objective/Functional Measures:     FOTO score: 59/100      Pain Level (0-10 scale) post treatment: 4-5/10    ASSESSMENT/Changes in Function:   Patient arrived with significant pain in her left flank and SI region. LLD noted during SI assessment and shotgun technique with MET used with correction observed but continued pain was reported as well as inability to perform TA activation without pain. Palpated lumbar spine with decreased movement noted during PA assessment of L3-S1 L transverse process. PTA assisted with lumbar rotation MET correction. Patient continued to be challenged with TA activation following this but was able to perform kegels without pain. Recommended hourly performance of kegels for neuromuscular improvement. Trialed ice for pain and management.   Patient will continue to benefit from skilled PT services to modify and progress therapeutic interventions, address functional mobility deficits, address ROM deficits, address strength deficits, analyze and address soft tissue restrictions, analyze and cue movement patterns, analyze and modify body mechanics/ergonomics, and assess and modify postural abnormalities to attain remaining goals.      [x]  See Plan of Care  []  See progress note/recertification  []  See Discharge Summary           PLAN  []  Upgrade activities as tolerated     []  Continue plan of care  []  Update interventions per flow sheet       [x]  Discharge due to: insurance auth  []  Other:_      Luis Carvajal, PT 2/13/2023

## 2023-02-15 ENCOUNTER — VIRTUAL VISIT (OUTPATIENT)
Dept: SOCIAL WORK | Age: 33
End: 2023-02-15
Payer: COMMERCIAL

## 2023-02-15 DIAGNOSIS — F90.9 ATTENTION DEFICIT HYPERACTIVITY DISORDER (ADHD), UNSPECIFIED ADHD TYPE: ICD-10-CM

## 2023-02-15 DIAGNOSIS — F41.1 GAD (GENERALIZED ANXIETY DISORDER): ICD-10-CM

## 2023-02-15 DIAGNOSIS — F33.1 MODERATE EPISODE OF RECURRENT MAJOR DEPRESSIVE DISORDER (HCC): Primary | ICD-10-CM

## 2023-02-15 DIAGNOSIS — F43.21 COMPLICATED GRIEF: ICD-10-CM

## 2023-02-16 NOTE — PROGRESS NOTES
History of Present Illness: Thomas Ruvalcaba is a 34 y.o. female who presents with symptoms of depression, anxiety and grief, ADHD     Duration of session: 58 min     Mental Status exam:           Sensorium  oriented to time, place and person   Relations cooperative   Appearance:  age appropriate   Motor Behavior:  restless and within normal limits   Speech:  normal pitch and normal volume   Thought Process: goal directed   Thought Content free of delusions, free of hallucinations and not internally preoccupied    Suicidal ideations none   Homicidal ideations none   Mood:  anxious and stable   Affect:  anxious, full range, stable and mood-congruent   Memory recent  adequate   Memory remote:  adequate   Concentration:  impaired   Abstraction:  abstract   Insight:  good   Reliability good   Judgment:  good            DIAGNOSIS AND IMPRESSION:        Axis I: mdd, adhd, grief, BETTE  Axis II: No diagnosis  Axis III: History reviewed. No pertinent past medical history. Axis IV: Problems with primary support group, Occupational problems and Other psychosocial or environmental problems  Axis V:  61-70 mild symptoms        Strengths: humor, smart, hard working  Trauma: mom mdd, poor functioning when cl in childhood     Client presents via doxy vv for follow up.  cl stable but reports continued struggle with getting started on important tasks, work related. Also displays rigidity in thinking, anxiety related. Follow up next week. Thomas Ruvalcaba (: 1990) is a 34 y.o. female, established patient, here for evaluation of the following chief complaint(s):       Thomas Ruvalcaba, was evaluated through a synchronous (real-time) audio-video encounter. The patient (or guardian if applicable) is aware that this is a billable service, which includes applicable co-pays. This Virtual Visit was conducted with patient's (and/or legal guardian's) consent.  The visit was conducted pursuant to the emergency declaration under the [address] [facility] [address] waiver authority and the [employer] and [employer]. Patient identification was verified, and a caregiver was present when appropriate. The patient was located at: Home: [address] [address] [facility] [address]  The provider was located at: [facility] [address] [facility] [address] [facility] was used to authenticate this note.   -- Gilmar Campos LCSW

## 2023-02-23 ENCOUNTER — HOSPITAL ENCOUNTER (OUTPATIENT)
Dept: PHYSICAL THERAPY | Age: 33
Discharge: HOME | End: 2023-02-23
Payer: COMMERCIAL

## 2023-02-23 PROCEDURE — 97161 PT EVAL LOW COMPLEX 20 MIN: CPT
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
## 2023-03-01 ENCOUNTER — VIRTUAL VISIT (OUTPATIENT)
Dept: SOCIAL WORK | Age: 33
End: 2023-03-01
Payer: COMMERCIAL

## 2023-03-01 DIAGNOSIS — F90.9 ATTENTION DEFICIT HYPERACTIVITY DISORDER (ADHD), UNSPECIFIED ADHD TYPE: ICD-10-CM

## 2023-03-01 DIAGNOSIS — F33.1 MODERATE EPISODE OF RECURRENT MAJOR DEPRESSIVE DISORDER (HCC): Primary | ICD-10-CM

## 2023-03-01 DIAGNOSIS — F43.21 COMPLICATED GRIEF: ICD-10-CM

## 2023-03-01 DIAGNOSIS — F41.1 GAD (GENERALIZED ANXIETY DISORDER): ICD-10-CM

## 2023-03-01 PROCEDURE — 90837 PSYTX W PT 60 MINUTES: CPT | Performed by: SOCIAL WORKER

## 2023-03-02 ENCOUNTER — HOSPITAL ENCOUNTER (OUTPATIENT)
Dept: PHYSICAL THERAPY | Age: 33
Discharge: HOME | End: 2023-03-02
Payer: COMMERCIAL

## 2023-03-02 PROCEDURE — 97110 THERAPEUTIC EXERCISES: CPT

## 2023-03-02 PROCEDURE — 97112 NEUROMUSCULAR REEDUCATION: CPT

## 2023-03-02 PROCEDURE — 97112 NEUROMUSCULAR REEDUCATION: CPT | Performed by: PHYSICAL THERAPIST

## 2023-03-02 PROCEDURE — 97140 MANUAL THERAPY 1/> REGIONS: CPT

## 2023-03-02 NOTE — PROGRESS NOTES
PT DAILY TREATMENT NOTE - [facility] 2-15    Patient Name: Deb Pay  Date:3/2/2023  : 1990  [x]  Patient  Verified  Payor: Lance Brodya / Plan: Sherice Palmer / Product Type: HMO /    In time: 8:07A  Out time: 8:54A  Total Treatment Time (min): 47  Total Timed Codes (min): 47  1:1 Treatment Time ( only): 42   Visit #:  2    Treatment Area: Lower back pain [M54.50]    SUBJECTIVE  Pain Level (0-10 scale): 3/10  Any medication changes, allergies to medications, adverse drug reactions, diagnosis change, or new procedure performed?: [x] No    [] Yes (see summary sheet for update)  Subjective functional status/changes:   [] No changes reported  Sharp pain L side of back R hip feels like she has worked out hard and she is sore. Been like that since last week. Pregnancy test was negative. She also reports worst period she has ever had last week. She was constipated tk to the progesterone supplement which increased cramping.      OBJECTIVE    5 min Therapeutic Exercise:  [x] See flow sheet :   Rationale: increase ROM, increase strength, improve coordination, improve balance, and increase proprioception to improve the patients ability to increase function and mobility     32 min Neuromuscular Re-education:  [x]  See flow sheet : quad ped TA sets, TA set with sh ext, core press   Rationale:  increase ROM, increase strength, improve coordination, improve balance, and increase proprioception   to improve the patients ability to increase core stability    10 min Manual Therapy: FRSR L3-5, assessment of pelvic alignment    Rationale: decrease pain, increase ROM, increase tissue extensibility, decrease edema , decrease trigger points, and increase postural awareness to improve the patients ability to increase mobility    With   [] TE   [] TA   [] Neuro   [] SC   [] other: Patient Education: [x] Review HEP    [] Progressed/Changed HEP based on:   [] positioning   [] body mechanics   [] transfers   [] heat/ice application    [] other:      Other Objective/Functional Measures: --     Pain Level (0-10 scale) post treatment: 1/10    ASSESSMENT/Changes in Function:   Pt reported increase in oblique and QL activation with attempted basic multifidus today. Held on this today and focused on TA set in quad ped position. Pt did report pain getting in and out of car along with sitting and driving. Pt advised to use jacket as lumbar roll to provide extra support along with use steering to brace and engage TA. Patient will continue to benefit from skilled PT services to modify and progress therapeutic interventions, address functional mobility deficits, address ROM deficits, address strength deficits, analyze and address soft tissue restrictions, analyze and cue movement patterns, analyze and modify body mechanics/ergonomics, assess and modify postural abnormalities, and instruct in home and community integration to attain remaining goals. []  See Plan of Care  []  See progress note/recertification  []  See Discharge Summary         Progress towards goals / Updated goals:  Long Term Goals: To be accomplished in 10-12 treatments:  1) Pt will be able to sit greater than 90 minutes without increased pain at work to better engage and perform vocational tasks. 2) Pt will be able to drive greater than 30 minutes without increase of pain  3) Pt will be able to rise supine to  the morning 4 days in a row without report of pain. 4) Pt will be able to retrieve item from ground without pain and with good form consistently during treatment session. 5) Pt will be able to demonstrate improvement in FOTO score >=70/100 to indicate improvement in functional mobility. 6) Pt will be independent in progressive HEP. Frequency / Duration: Patient to be seen 1-2 times per week for 10-12 treatments.     PLAN  [x]  Upgrade activities as tolerated     [x]  Continue plan of care  []  Update interventions per flow sheet       [] Discharge due to:_  []  Other:_      Christopher Alvarez, MEGHANA 3/2/2023

## 2023-03-06 ENCOUNTER — HOSPITAL ENCOUNTER (OUTPATIENT)
Dept: PHYSICAL THERAPY | Age: 33
Discharge: HOME | End: 2023-03-06
Payer: COMMERCIAL

## 2023-03-06 PROCEDURE — 97140 MANUAL THERAPY 1/> REGIONS: CPT

## 2023-03-06 PROCEDURE — 97110 THERAPEUTIC EXERCISES: CPT

## 2023-03-06 PROCEDURE — 97112 NEUROMUSCULAR REEDUCATION: CPT

## 2023-03-06 NOTE — PROGRESS NOTES
PT DAILY TREATMENT NOTE - [facility] 2-15    Patient Name: Diogenes Key  Date:3/6/2023  : 1990  [x]  Patient  Verified  Payor: Margaret Martel / Plan: Will Current / Product Type: HMO /    In time:   Out time: 134  Total Treatment Time (min): 68  Total Timed Codes (min): 68  1:1 Treatment Time ( only): 63   Visit #:  3    Treatment Area: Lower back pain [M54.50]    SUBJECTIVE  Pain Level (0-10 scale): 3/10  Any medication changes, allergies to medications, adverse drug reactions, diagnosis change, or new procedure performed?: [x] No    [] Yes (see summary sheet for update)  Subjective functional status/changes:   [] No changes reported  I am not as sore in the mornings but it still seems to be there.      OBJECTIVE      15 min Therapeutic Exercise:  [x] See flow sheet : discussion of posture while driving and    Rationale: increase ROM and increase strength to improve the patients ability to work without pain    35 min Neuromuscular Re-education:  [x]  See flow sheet : dead bug with SWB for TA, core press, SLS with red ball core press, basic multifidi (level 1)   Rationale: increase ROM, increase strength, improve coordination, and increase proprioception  to improve the patients ability to engage core during dynamic activities    18 min Manual Therapy: FRSR L3-5, assessment of pelvic alignment     Rationale: decrease pain, increase ROM, increase tissue extensibility, and decrease trigger points to improve the patients ability to sleep without pain          With   [] TE   [] TA   [] Neuro   [] SC   [] other: Patient Education: [x] Review HEP    [] Progressed/Changed HEP based on:   [] positioning   [] body mechanics   [] transfers   [] heat/ice application    [] other:      Other Objective/Functional Measures:      Pain Level (0-10 scale) post treatment: fatigue in lumbar region, 2-3/10    ASSESSMENT/Changes in Function:   Patient with significant improvement in lumbar and SI alignment following MET correction, however patient does not notice any significant improvement in discomfort or pain. Slowly progressing core stabilization exercises but she fatigues quickly. Patient will continue to benefit from skilled PT services to modify and progress therapeutic interventions, address functional mobility deficits, address ROM deficits, address strength deficits, analyze and address soft tissue restrictions, analyze and cue movement patterns, analyze and modify body mechanics/ergonomics, and assess and modify postural abnormalities to attain remaining goals. [x]  See Plan of Care  []  See progress note/recertification  []  See Discharge Summary         Progress towards goals / Updated goals:  Long Term Goals: To be accomplished in 10-12 treatments:  1) Pt will be able to sit greater than 90 minutes without increased pain at work to better engage and perform vocational tasks. 2) Pt will be able to drive greater than 30 minutes without increase of pain  3) Pt will be able to rise supine to  the morning 4 days in a row without report of pain. 4) Pt will be able to retrieve item from ground without pain and with good form consistently during treatment session. 5) Pt will be able to demonstrate improvement in FOTO score >=70/100 to indicate improvement in functional mobility. 6) Pt will be independent in progressive HEP.         PLAN  [x]  Upgrade activities as tolerated     [x]  Continue plan of care  [x]  Update interventions per flow sheet       []  Discharge due to:_  []  Other:_      Joseline Blackwell, PT 3/6/2023

## 2023-03-08 ENCOUNTER — VIRTUAL VISIT (OUTPATIENT)
Dept: SOCIAL WORK | Age: 33
End: 2023-03-08
Payer: COMMERCIAL

## 2023-03-08 DIAGNOSIS — F43.21 COMPLICATED GRIEF: ICD-10-CM

## 2023-03-08 DIAGNOSIS — F90.9 ATTENTION DEFICIT HYPERACTIVITY DISORDER (ADHD), UNSPECIFIED ADHD TYPE: ICD-10-CM

## 2023-03-08 DIAGNOSIS — F33.1 MODERATE EPISODE OF RECURRENT MAJOR DEPRESSIVE DISORDER (HCC): Primary | ICD-10-CM

## 2023-03-08 DIAGNOSIS — F41.1 GAD (GENERALIZED ANXIETY DISORDER): ICD-10-CM

## 2023-03-08 PROCEDURE — 90837 PSYTX W PT 60 MINUTES: CPT | Performed by: SOCIAL WORKER

## 2023-03-08 NOTE — PROGRESS NOTES
Sasha Venegas (: 1990) is a 34 y.o. female, established patient, here for evaluation of the following chief complaint(s):   Follow-up and Psychotherapy (Doxy 8)       ASSESSMENT/PLAN:  Below is the assessment and plan developed based on review of pertinent history, labs, studies, and medications. 1. Moderate episode of recurrent major depressive disorder ([address]  2. BETTE (generalized anxiety disorder)  3. Complicated grief  4. Attention deficit hyperactivity disorder (ADHD), unspecified ADHD type      Return in about 1 week (around 3/15/2023). Sasha Venegas, was evaluated through a synchronous (real-time) audio-video encounter. The patient (or guardian if applicable) is aware that this is a billable service, which includes applicable co-pays. This Virtual Visit was conducted with patient's (and/or legal guardian's) consent. The visit was conducted pursuant to the emergency declaration under the [address] [place] [address], [address] [place] [address] authority and the [employer] and [employer] General Act. Patient identification was verified, and a caregiver was present when appropriate. The patient was located at: Home: [address] [place] [address]  The provider was located at: Home: [address] [place] [address] was used to authenticate this note.   -- Suresh Maguire LCSW   History of Present Illness: Sasha Venegas is a 34 y.o. female who presents with symptoms of depression, anxiety and grief, ADHD     Duration of session: 55 min     Mental Status exam:           Sensorium  oriented to time, place and person   Relations cooperative   Appearance:  age appropriate   Motor Behavior:  restless and within normal limits   Speech:  normal pitch and normal volume   Thought Process: goal directed   Thought Content free of delusions, free of hallucinations and not internally preoccupied    Suicidal ideations none   Homicidal ideations none   Mood: anxious and stable   Affect:  anxious, full range, stable and mood-congruent   Memory recent  adequate   Memory remote:  adequate   Concentration:  impaired   Abstraction:  abstract   Insight:  good   Reliability good   Judgment:  good            DIAGNOSIS AND IMPRESSION:        Axis I: mdd, adhd, grief, BETTE  Axis II: No diagnosis  Axis III: History reviewed. No pertinent past medical history. Axis IV: Problems with primary support group, Occupational problems and Other psychosocial or environmental problems  Axis V:  61-70 mild symptoms        Strengths: humor, smart, hard working  Trauma: mom mdd, poor functioning when cl in childhood     Client presents via doxy vv for follow up, stable and improved space. reports having gotten through some meetings with work. 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 Should provide good structure. Follow up next week.

## 2023-03-14 ENCOUNTER — OFFICE VISIT (OUTPATIENT)
Dept: BEHAVIORAL/MENTAL HEALTH CLINIC | Age: 33
End: 2023-03-14
Payer: COMMERCIAL

## 2023-03-14 VITALS
DIASTOLIC BLOOD PRESSURE: 78 MMHG | RESPIRATION RATE: 17 BRPM | HEIGHT: 67 IN | TEMPERATURE: 98.4 F | SYSTOLIC BLOOD PRESSURE: 114 MMHG | OXYGEN SATURATION: 97 % | WEIGHT: 210.4 LBS | BODY MASS INDEX: 33.02 KG/M2 | HEART RATE: 109 BPM

## 2023-03-14 DIAGNOSIS — F39 MOOD DISORDER (HCC): Primary | ICD-10-CM

## 2023-03-14 DIAGNOSIS — F90.9 ATTENTION DEFICIT HYPERACTIVITY DISORDER (ADHD), UNSPECIFIED ADHD TYPE: ICD-10-CM

## 2023-03-14 DIAGNOSIS — F41.1 GAD (GENERALIZED ANXIETY DISORDER): ICD-10-CM

## 2023-03-14 PROCEDURE — 99214 OFFICE O/P EST MOD 30 MIN: CPT | Performed by: NURSE PRACTITIONER

## 2023-03-14 RX ORDER — QUETIAPINE FUMARATE 100 MG/1
50 TABLET, FILM COATED ORAL
Qty: 30 TABLET | Refills: 2
Start: 2023-03-14

## 2023-03-14 RX ORDER — BUSPIRONE HYDROCHLORIDE 10 MG/1
10 TABLET ORAL 3 TIMES DAILY
Qty: 90 TABLET | Refills: 2 | Status: SHIPPED | OUTPATIENT
Start: 2023-03-14

## 2023-03-14 NOTE — PROGRESS NOTES
Chief Complaint   Patient presents with    Medication Management     Visit Vitals  /78 (BP 1 Location: Left upper arm, BP Patient Position: Sitting, BP Cuff Size: Large adult)   Pulse (!) 109   Temp 98.4 °F (36.9 °C) (Oral)   Resp 17   Ht 5' 7\" (1.702 m)   Wt 95.4 kg (210 lb 6.4 oz)   SpO2 97%   BMI 32.95 kg/m²     3 most recent PHQ Screens 3/14/2023   Little interest or pleasure in doing things Nearly every day   Feeling down, depressed, irritable, or hopeless Nearly every day   Total Score PHQ 2 6   Trouble falling or staying asleep, or sleeping too much More than half the days   Feeling tired or having little energy More than half the days   Poor appetite, weight loss, or overeating Several days   Feeling bad about yourself - or that you are a failure or have let yourself or your family down Nearly every day   Trouble concentrating on things such as school, work, reading, or watching TV Nearly every day   Moving or speaking so slowly that other people could have noticed; or the opposite being so fidgety that others notice Not at all   Thoughts of being better off dead, or hurting yourself in some way Several days   PHQ 9 Score 18   How difficult have these problems made it for you to do your work, take care of your home and get along with others Extremely difficult     1. Have you been to the ER, urgent care clinic since your last visit? Hospitalized since your last visit? No    2. Have you seen or consulted any other health care providers outside of the [address] [place] [address] since your last visit? Include any pap smears or colon screening.  No

## 2023-03-14 NOTE — PROGRESS NOTES
CHIEF COMPLAINT:  Rajesh Mann is a 34 y.o. female and was seen today for follow-up of psychiatric condition and psychotropic medication management. HPI:    Alphonse Gonzalez reports the following psychiatric symptoms by hx:  depression, anxiety, and intention deficit . Overall symptoms have been present for years. Currently symptoms are of moderate/high severity. The symptoms occur daily. Patient reports she has severe mood swings after stopping progesterone treatment. Pt reports medications are causing some excessive daytime sleepiness. She reports she continues to struggle with depression most days. Met with pt  for appt today or to review current treatment plan. FAMILY/SOCIAL HX: External Stressors     REVIEW OF SYSTEMS:  Psychiatric symptoms being monitored for:  depression, ADHD, anxiety   Appetite:decreased   Sleep: increased more than normal   Neuro: denies     Visit Vitals  /78 (BP 1 Location: Left upper arm, BP Patient Position: Sitting, BP Cuff Size: Large adult)   Pulse (!) 109   Temp 98.4 °F (36.9 °C) (Oral)   Resp 17   Ht 5' 7\" (1.702 m)   Wt 95.4 kg (210 lb 6.4 oz)   SpO2 97%   BMI 32.95 kg/m²       Side Effects:  somnolence    MENTAL STATUS EXAM:   Sensorium  oriented to time, place and person   Relations cooperative   Appearance:  age appropriate and within normal Limits   Motor Behavior:  within normal limits   Speech:  normal pitch and normal volume   Thought Process: within normal limits   Thought Content free of delusions and free of hallucinations   Suicidal ideations none   Homicidal ideations none   Mood:  anxious and depressed   Affect:  anxious and depressed   Memory recent  adequate   Memory remote:  adequate   Concentration:  impaired   Abstraction:  abstract   Insight:  good   Reliability good   Judgment:  good     MEDICAL DECISION MAKING:  Problems addressed today:    ICD-10-CM ICD-9-CM    1. Mood disorder (HCC)  F39 296.90       2.  BETTE (generalized anxiety disorder)  F41.1 300.02 busPIRone (BUSPAR) 10 mg tablet      3. Attention deficit hyperactivity disorder (ADHD), unspecified ADHD type  F90.9 314.01           Assessment:   Gaetano Gama is responding to treatment. Symptoms are exacerbated. Anxiety had improved but it is has returned. She continues to struggle with depression, but has not been able to take Seroquel consistently due excessive daytime sleepiness. Discussed current medications and dosages. Will lower Seroquel to 50 mg. Risks vs benefits discussed. Reviewed treatment goals and target symptoms to monitor for. Plan:   1. Current Outpatient Medications   Medication Sig Dispense Refill    busPIRone (BUSPAR) 10 mg tablet Take 1 Tablet by mouth three (3) times daily. Indications: repeated episodes of anxiety 90 Tablet 2    progesterone (PROMETRIUM) 100 mg capsule Place one tablet vaginally daily day 14 of menses for 2 weeks 30 Capsule 3    amphetamine-dextroamphetamine XR (Adderall XR) 15 mg XR capsule Take 15 mg by mouth every morning. QUEtiapine (SEROquel) 100 mg tablet Take 1 Tablet by mouth nightly. 30 Tablet 2    prenatal multivit-ca-min-fe-fa tab Take  by mouth. cyclobenzaprine (FLEXERIL) 10 mg tablet Take 1 Tablet by mouth three (3) times daily as needed for Muscle Spasm(s). 21 Tablet 0    spironolactone (ALDACTONE) 50 mg tablet Take 50 mg by mouth two (2) times a day. (Patient not taking: Reported on 2/7/2023)      sodium fluoride-pot nitrate 1.1-5 % pste       fexofenadine (ALLEGRA) 180 mg tablet Take 180 mg by mouth daily as needed. melatonin 5 mg cap capsule Take 5 mg by mouth nightly. (Patient not taking: Reported on 2/7/2023)      dextroamphetamine-amphetamine (ADDERALL) 10 mg tablet dextroamphetamine-amphetamine 10 mg tablet            medication changes made today: Seroquel 50 mg po nightly     2. Counseling and coordination of care including instructions for treatment, risks/benefits, risk factor reduction and patient/family education.  She agrees with the plan. Patient instructed to call with any side effects, questions or issues. 3.    Follow-up and Dispositions    Return in about 6 weeks (around 4/25/2023) for med/management .            3/14/2023  Verona Nyhan, NP

## 2023-03-15 ENCOUNTER — HOSPITAL ENCOUNTER (OUTPATIENT)
Dept: PHYSICAL THERAPY | Age: 33
Discharge: HOME | End: 2023-03-15
Payer: COMMERCIAL

## 2023-03-15 ENCOUNTER — VIRTUAL VISIT (OUTPATIENT)
Dept: SOCIAL WORK | Age: 33
End: 2023-03-15

## 2023-03-15 DIAGNOSIS — F33.1 MODERATE EPISODE OF RECURRENT MAJOR DEPRESSIVE DISORDER (HCC): Primary | ICD-10-CM

## 2023-03-15 DIAGNOSIS — F43.21 COMPLICATED GRIEF: ICD-10-CM

## 2023-03-15 DIAGNOSIS — F90.9 ATTENTION DEFICIT HYPERACTIVITY DISORDER (ADHD), UNSPECIFIED ADHD TYPE: ICD-10-CM

## 2023-03-15 DIAGNOSIS — F41.1 GAD (GENERALIZED ANXIETY DISORDER): ICD-10-CM

## 2023-03-15 PROCEDURE — 97140 MANUAL THERAPY 1/> REGIONS: CPT

## 2023-03-15 PROCEDURE — 97110 THERAPEUTIC EXERCISES: CPT

## 2023-03-15 PROCEDURE — 97112 NEUROMUSCULAR REEDUCATION: CPT

## 2023-03-15 NOTE — PROGRESS NOTES
PT DAILY TREATMENT NOTE - [facility] 2-15    Patient Name: Dillon Vyas  Date:3/15/2023  : 1990  [x]  Patient  Verified  Payor: Alex Jacques / Plan: Kaya Vazquez / Product Type: HMO /    In time: 8:36A  Out time: 9:50A  Total Treatment Time (min): 74  Total Timed Codes (min): 74  1:1 Treatment Time ( only): 58  Visit #:  4    Treatment Area: Lower back pain [M54.50]    SUBJECTIVE  Pain Level (0-10 scale): 4/10  Any medication changes, allergies to medications, adverse drug reactions, diagnosis change, or new procedure performed?: [x] No    [] Yes (see summary sheet for update)  Subjective functional status/changes:   [] No changes reported  Pt repots she still feels like her pain level is the same throughout the day.  She is      OBJECTIVE      28 min Therapeutic Exercise:  [x] See flow sheet :     Rationale: increase ROM and increase strength to improve the patients ability to work without pain    36 min Neuromuscular Re-education:  [x]  See flow sheet : dead bug with SWB for TA, core press, SLS with red ball core press, basic multifidi (level 1)   Rationale: increase ROM, increase strength, improve coordination, and increase proprioception  to improve the patients ability to engage core during dynamic activities    10 min Manual Therapy: FRSR L3-5, assessment of pelvic alignment     Rationale: decrease pain, increase ROM, increase tissue extensibility, and decrease trigger points to improve the patients ability to sleep without pain          With   [] TE   [] TA   [] Neuro   [] SC   [] other: Patient Education: [x] Review HEP    [] Progressed/Changed HEP based on:   [] positioning   [] body mechanics   [] transfers   [] heat/ice application    [] other:      Other Objective/Functional Measures: --     Pain Level (0-10 scale) post treatment: fatigue    ASSESSMENT/Changes in Function:   Altered basic multifidus to floor secondary to pt unsure of where she was feeling engagement due to the unsteadiness to treatment table surface. Able to feel proper abdominal engagement for first couple of basic multifids but began to feel her back engage due to fatigue. Advised pt on when doing exercises at home to stop when she begins to feel any discomfort in back as that is her cue for muscle failure from core. Patient will continue to benefit from skilled PT services to modify and progress therapeutic interventions, address functional mobility deficits, address ROM deficits, address strength deficits, analyze and address soft tissue restrictions, analyze and cue movement patterns, analyze and modify body mechanics/ergonomics, and assess and modify postural abnormalities to attain remaining goals. [x]  See Plan of Care  []  See progress note/recertification  []  See Discharge Summary         Progress towards goals / Updated goals:  Long Term Goals: To be accomplished in 10-12 treatments:  1) Pt will be able to sit greater than 90 minutes without increased pain at work to better engage and perform vocational tasks. 2) Pt will be able to drive greater than 30 minutes without increase of pain  3) Pt will be able to rise supine to  the morning 4 days in a row without report of pain. 4) Pt will be able to retrieve item from ground without pain and with good form consistently during treatment session. 5) Pt will be able to demonstrate improvement in FOTO score >=70/100 to indicate improvement in functional mobility. 6) Pt will be independent in progressive HEP.         PLAN  [x]  Upgrade activities as tolerated     [x]  Continue plan of care  [x]  Update interventions per flow sheet       []  Discharge due to:_  []  Other:_      Debra Mariee, PTA 3/15/2023

## 2023-03-15 NOTE — PROGRESS NOTES
History of Present Illness: Teresa Tay is a 34 y.o. female who presents with symptoms of depression, anxiety and grief, ADHD     Duration of session: 55 min     Mental Status exam:           Sensorium  oriented to time, place and person   Relations cooperative   Appearance:  age appropriate   Motor Behavior:  restless and within normal limits   Speech:  normal pitch and normal volume   Thought Process: goal directed   Thought Content free of delusions, free of hallucinations and not internally preoccupied    Suicidal ideations none   Homicidal ideations none   Mood:  anxious and stable   Affect:  anxious, full range, stable and mood-congruent   Memory recent  adequate   Memory remote:  adequate   Concentration:  impaired   Abstraction:  abstract   Insight:  good   Reliability good   Judgment:  good            DIAGNOSIS AND IMPRESSION:        Axis I: mdd, adhd, grief, BETTE  Axis II: No diagnosis  Axis III: History reviewed. No pertinent past medical history. Axis IV: Problems with primary support group, Occupational problems and Other psychosocial or environmental problems  Axis V:  61-70 mild symptoms        Strengths: humor, smart, hard working  Trauma: mom mdd, poor functioning when cl in childhood     Client presents via doxy vv for follow up, stable and improved space. reports nanny daigle fell through, disappointed. Will continue to work on establishing and maintaining structure. Follow up next week. Teresa Tay (: 1990) is a 34 y.o. female, established patient, here for evaluation of the following chief complaint(s):   Follow-up and Psychotherapy (doxy)       ASSESSMENT/PLAN:  Below is the assessment and plan developed based on review of pertinent history, labs, studies, and medications. 1. Moderate episode of recurrent major depressive disorder ([facility] [address] [facility]  2. Attention deficit hyperactivity disorder (ADHD), unspecified ADHD type  3. Complicated grief  4.  BETTE (generalized anxiety disorder)      Return in about 1 week (around 3/22/2023). Judy Eli, was evaluated through a synchronous (real-time) audio-video encounter. The patient (or guardian if applicable) is aware that this is a billable service, which includes applicable co-pays. This Virtual Visit was conducted with patient's (and/or legal guardian's) consent. The visit was conducted pursuant to the emergency declaration under the [address] [place] [address], [address] [place] [address] authority and the [employer] General Act. Patient identification was verified, and a caregiver was present when appropriate. The patient was located at: Home: [address] [place] [address]  The provider was located at: Home: [address] [facility] was used to authenticate this note.   -- Rigoberto Castellano LCSW

## 2023-03-17 ENCOUNTER — OFFICE VISIT (OUTPATIENT)
Dept: INTERNAL MEDICINE CLINIC | Age: 33
End: 2023-03-17
Payer: COMMERCIAL

## 2023-03-17 VITALS
DIASTOLIC BLOOD PRESSURE: 84 MMHG | RESPIRATION RATE: 18 BRPM | OXYGEN SATURATION: 98 % | BODY MASS INDEX: 32.8 KG/M2 | TEMPERATURE: 98.1 F | HEIGHT: 67 IN | WEIGHT: 209 LBS | HEART RATE: 104 BPM | SYSTOLIC BLOOD PRESSURE: 132 MMHG

## 2023-03-17 DIAGNOSIS — E55.9 VITAMIN D DEFICIENCY: ICD-10-CM

## 2023-03-17 DIAGNOSIS — Z00.00 WELL ADULT EXAM: Primary | ICD-10-CM

## 2023-03-17 DIAGNOSIS — E66.9 OBESITY (BMI 30.0-34.9): ICD-10-CM

## 2023-03-17 DIAGNOSIS — F33.1 MODERATE EPISODE OF RECURRENT MAJOR DEPRESSIVE DISORDER (HCC): ICD-10-CM

## 2023-03-17 DIAGNOSIS — F41.1 GAD (GENERALIZED ANXIETY DISORDER): ICD-10-CM

## 2023-03-17 DIAGNOSIS — F90.9 ATTENTION DEFICIT HYPERACTIVITY DISORDER (ADHD), UNSPECIFIED ADHD TYPE: ICD-10-CM

## 2023-03-17 PROCEDURE — 99395 PREV VISIT EST AGE 18-39: CPT | Performed by: INTERNAL MEDICINE

## 2023-03-17 NOTE — PROGRESS NOTES
Nurys Ramirez is a 28 y.o. female and presents with Complete Physical    .  Subjective:    Pt has tbeen referred for ADHD testing by her psych provider    [facility] Mer[facility]-psychiatrist      Allergic rhinitis     Chronic lbp      Wt Readings from Last 3 Encounters:   23 209 lb (94.8 kg)   23 210 lb 6.4 oz (95.4 kg)   23 210 lb (95.3 kg)         SH-    -pt has an IUD in place    FH- mother alive HTN, obesity, depression, hypothyroidism   Father  2020 cholangiocarcinoma,  HTN, obesity   1 brother GERD    HM  Immunizations  Eye care  Dental care  Pap UTD        Review of Systems  Review of systems (12) negative, except noted above. History reviewed. No pertinent past medical history. Past Surgical History:   Procedure Laterality Date    HX CHOLECYSTECTOMY      HX WISDOM TEETH EXTRACTION       Social History     Socioeconomic History    Marital status:    Tobacco Use    Smoking status: Former     Passive exposure: Past    Smokeless tobacco: Never   Vaping Use    Vaping Use: Never used   Substance and Sexual Activity    Alcohol use: Yes    Drug use: Never    Sexual activity: Yes     Partners: Male     Birth control/protection: I.U.D.      Social Determinants of Health     Financial Resource Strain: Low Risk     Difficulty of Paying Living Expenses: Not hard at all   Food Insecurity: No Food Insecurity    Worried About Running Out of Food in the Last Year: Never true    Ran Out of Food in the Last Year: Never true     Family History   Problem Relation Age of Onset    OSTEOARTHRITIS Mother     Asthma Mother         Inhaler    Hypertension Mother     Psychiatric Disorder Mother     Cancer Father         Cholangiocarcinoma    Gall Bladder Disease Father     Hypertension Father     OSTEOARTHRITIS Maternal Grandmother         RA    Cancer Maternal Grandmother         Uterine    Psychiatric Disorder Maternal Grandmother     Cancer Paternal Grandfather         Prostate Diabetes Paternal Grandfather     Gall Bladder Disease Paternal Grandfather     Psychiatric Disorder Paternal Grandfather     OSTEOARTHRITIS Maternal Aunt         Lupus    Psychiatric Disorder Maternal Aunt     Gall Bladder Disease Paternal Uncle        Allergies   Allergen Reactions    Oxycodone Nausea Only and Nausea and Vomiting    Codeine Nausea Only       Objective:  Visit Vitals  /84 (BP 1 Location: Left upper arm, BP Patient Position: Sitting, BP Cuff Size: Adult)   Pulse (!) 104   Temp 98.1 °F (36.7 °C) (Temporal)   Resp 18   Ht 5' 7\" (1.702 m)   Wt 209 lb (94.8 kg)   SpO2 98%   BMI 32.73 kg/m²     Physical Exam:   General appearance - alert, well appearing, and in no distress. Pleasant  Mental status - alert, oriented to person, place, and time  EYE-LISA, EOMI, corneas normal, no foreign bodies  ENT-ENT exam normal, no neck nodes or sinus tenderness  Mouth - mucous membranes moist, pharynx normal without lesions  Neck - supple, no significant adenopathy   Chest - clear to auscultation, no wheezes, rales or rhonchi, symmetric air entry   Heart - tachycardic  Abdomen - soft, nontender, nondistended, no masses or organomegaly  Ext-peripheral pulses normal, no pedal edema, no clubbing or cyanosis  Skin-Warm and dry. no hyperpigmentation, vitiligo, or suspicious lesions  Neuro -alert, oriented, normal speech, no focal findings or movement disorder noted        Results for orders placed or performed in visit on 04/04/22   PAP IG, RFX APTIMA HPV ASCUS ([id number])   Result Value Ref Range    Diagnosis Comment     Specimen adequacy Comment     Clinician provided ICD10 Comment     Performed by: Silvino     . Aury Salazar Note: Comment     Test methodology Comment     . Comment        Assessment/Plan:    ICD-10-CM ICD-9-CM    1.  Well adult exam  Z78.64 L80.4 METABOLIC PANEL, COMPREHENSIVE      LIPID PANEL      CBC WITH AUTOMATED DIFF      THYROID CASCADE PROFILE      METABOLIC PANEL, COMPREHENSIVE      LIPID PANEL CBC WITH AUTOMATED DIFF      THYROID CASCADE PROFILE      2. Moderate episode of recurrent major depressive disorder (HCC)  F33.1 296.32       3. Attention deficit hyperactivity disorder (ADHD), unspecified ADHD type  F90.9 314.01       4. BETTE (generalized anxiety disorder)  F41.1 300.02       5. Obesity (BMI 30.0-34. 9)  E66.9 278.00 THYROID CASCADE PROFILE      THYROID CASCADE PROFILE      6. Vitamin D deficiency  E55.9 268.9 VITAMIN D, 25 HYDROXY      VITAMIN D, 25 HYDROXY        Orders Placed This Encounter    METABOLIC PANEL, COMPREHENSIVE     Standing Status:   Future     Number of Occurrences:   1     Standing Expiration Date:   3/17/2024    LIPID PANEL     Standing Status:   Future     Number of Occurrences:   1     Standing Expiration Date:   3/17/2024    CBC WITH AUTOMATED DIFF     Standing Status:   Future     Number of Occurrences:   1     Standing Expiration Date:   3/17/2024    THYROID CASCADE PROFILE     Standing Status:   Future     Number of Occurrences:   1     Standing Expiration Date:   3/17/2024    VITAMIN D, 25 HYDROXY     Standing Status:   Future     Number of Occurrences:   1     Standing Expiration Date:   3/17/2024         1. Well adult exam    - METABOLIC PANEL, COMPREHENSIVE; Future  - LIPID PANEL; Future  - CBC WITH AUTOMATED DIFF; Future  - THYROID CASCADE PROFILE; Future  - METABOLIC PANEL, COMPREHENSIVE  - LIPID PANEL  - CBC WITH AUTOMATED DIFF  - THYROID CASCADE PROFILE    2. Moderate episode of recurrent major depressive disorder (HCC)  F/up psychatric NP and LCSW    3. Attention deficit hyperactivity disorder (ADHD), unspecified ADHD type  Testing pending    4. BETTE (generalized anxiety disorder)  Noted    5. Obesity (BMI 30.0-34. 9)  D/w pt daily walking (at least 20 minutes), healthy diet w fruits and/or veggies w each meal, portion sizes and weight loss  - THYROID CASCADE PROFILE; Future  - THYROID CASCADE PROFILE    6.  Vitamin D deficiency    - VITAMIN D, 25 HYDROXY; Future  - VITAMIN D, 25 HYDROXY        There are no Patient Instructions on file for this visit. I have reviewed with the patient details of the assessment and plan and all questions were answered. Relevent patient education was performed. The most recent lab findings were reviewed with the patient. An After Visit Summary was printed and given to the patient.       Kamilla Quintanilla MD

## 2023-03-17 NOTE — PROGRESS NOTES
Room: 2  Identified pt with two pt identifiers(name and ). Reviewed record in preparation for visit and have obtained necessary documentation. Chief Complaint   Patient presents with    Complete Physical        Vitals:    23 1504   BP: 132/84   Pulse: (!) 104   Resp: 18   Temp: 98.1 °F (36.7 °C)   TempSrc: Temporal   SpO2: 98%   Weight: 209 lb (94.8 kg)   Height: 5' 7\" (1.702 m)   PainSc:   3   PainLoc: Back       Health Maintenance Due   Topic    Hepatitis C Screening     DTaP/Tdap/Td series (1 - Tdap)    COVID-19 Vaccine (4 - Booster for Jim[place]nez Peter series)       1. \"Have you been to the ER, urgent care clinic since your last visit? Hospitalized since your last visit? \" No    2. \"Have you seen or consulted any other health care providers outside of the [address] [place] [address] since your last visit? \" No     3. For patients over 45: Has the patient had a colonoscopy? NA - based on age     If the patient is female:    4. For patients over 36: Has the patient had a mammogram? NA - based on age    11. For patients over 21: Has the patient had a pap smear? Yes - no Care Gap present    Current Outpatient Medications   Medication Instructions    amphetamine-dextroamphetamine XR (ADDERALL XR) 15 mg XR capsule 15 mg, Oral, 7AM    busPIRone (BUSPAR) 10 mg, Oral, 3 TIMES DAILY    cyclobenzaprine (FLEXERIL) 10 mg, Oral, 3 TIMES DAILY AS NEEDED    dextroamphetamine-amphetamine (ADDERALL) 10 mg tablet dextroamphetamine-amphetamine 10 mg tablet    fexofenadine (ALLEGRA) 180 mg, Oral, DAILY AS NEEDED    melatonin 5 mg, EVERY BEDTIME    prenatal multivit-ca-min-fe-fa tab Oral    progesterone (PROMETRIUM) 100 mg capsule Place one tablet vaginally daily day 14 of menses for 2 weeks    QUEtiapine (SEROQUEL) 50 mg, Oral, EVERY BEDTIME    sodium fluoride-pot nitrate 1.1-5 % pste No dose, route, or frequency recorded.     spironolactone (ALDACTONE) 50 mg, 2 TIMES DAILY       Allergies   Allergen Reactions    Oxycodone Nausea Only and Nausea and Vomiting    Codeine Nausea Only       Immunization History   Administered Date(s) Administered    COVID-19, PFIZER PURPLE top, DILUTE for use, (age 15 y+), IM, 30mcg/0.3mL 03/24/2021, 04/28/2021, 01/18/2022    HPV (Quad) 08/16/2010    Influenza Vaccine 12/22/2022       History reviewed. No pertinent past medical history.

## 2023-03-22 ENCOUNTER — OFFICE VISIT (OUTPATIENT)
Dept: INTERNAL MEDICINE CLINIC | Age: 33
End: 2023-03-22

## 2023-03-22 ENCOUNTER — HOSPITAL ENCOUNTER (OUTPATIENT)
Dept: PHYSICAL THERAPY | Age: 33
Discharge: HOME | End: 2023-03-22
Payer: COMMERCIAL

## 2023-03-22 ENCOUNTER — VIRTUAL VISIT (OUTPATIENT)
Dept: SOCIAL WORK | Age: 33
End: 2023-03-22

## 2023-03-22 VITALS
HEIGHT: 67 IN | SYSTOLIC BLOOD PRESSURE: 128 MMHG | HEART RATE: 80 BPM | WEIGHT: 207 LBS | TEMPERATURE: 98.2 F | DIASTOLIC BLOOD PRESSURE: 91 MMHG | RESPIRATION RATE: 18 BRPM | BODY MASS INDEX: 32.49 KG/M2 | OXYGEN SATURATION: 100 %

## 2023-03-22 DIAGNOSIS — F33.1 MODERATE EPISODE OF RECURRENT MAJOR DEPRESSIVE DISORDER (HCC): Primary | ICD-10-CM

## 2023-03-22 DIAGNOSIS — F43.21 COMPLICATED GRIEF: ICD-10-CM

## 2023-03-22 DIAGNOSIS — M54.50 ACUTE LEFT-SIDED LOW BACK PAIN WITHOUT SCIATICA: ICD-10-CM

## 2023-03-22 DIAGNOSIS — F41.1 GAD (GENERALIZED ANXIETY DISORDER): ICD-10-CM

## 2023-03-22 DIAGNOSIS — F90.9 ATTENTION DEFICIT HYPERACTIVITY DISORDER (ADHD), UNSPECIFIED ADHD TYPE: ICD-10-CM

## 2023-03-22 DIAGNOSIS — N92.6 MISSED MENSES: Primary | ICD-10-CM

## 2023-03-22 LAB — HCG INTACT+B SERPL-ACNC: NORMAL MIU/ML

## 2023-03-22 PROCEDURE — 97110 THERAPEUTIC EXERCISES: CPT

## 2023-03-22 PROCEDURE — 97535 SELF CARE MNGMENT TRAINING: CPT

## 2023-03-22 PROCEDURE — 99213 OFFICE O/P EST LOW 20 MIN: CPT | Performed by: INTERNAL MEDICINE

## 2023-03-22 RX ORDER — DIAZEPAM 5 MG/1
5-10 TABLET ORAL
Qty: 10 TABLET | Refills: 0 | Status: SHIPPED | OUTPATIENT
Start: 2023-03-22

## 2023-03-22 RX ORDER — LIDOCAINE 50 MG/G
2 PATCH TOPICAL EVERY 24 HOURS
Qty: 30 EACH | Refills: 0 | Status: SHIPPED | OUTPATIENT
Start: 2023-03-22

## 2023-03-22 RX ORDER — METHYLPREDNISOLONE 4 MG/1
TABLET ORAL
Qty: 1 DOSE PACK | Refills: 0 | Status: SHIPPED | OUTPATIENT
Start: 2023-03-22

## 2023-03-22 NOTE — PROGRESS NOTES
Dianelys Rubio is a 28 y.o. female and presents with Back Pain (7/10)    . Subjective:    Pt tweaked her back recently and is in significant pain. Pt has tried NSAIDS w/o relief. Pt denies bowel or bladder dysfxn or le weakness. She is in PT for her back currently. Pt is attempting pregnancy. No missed menses yet, but may be very early pregnant. PMH- ADD- Bharti Landeroslabyenifer-psychiatrist      Allergic rhinitis     Chronic lbp      Wt Readings from Last 3 Encounters:   23 207 lb (93.9 kg)   23 209 lb (94.8 kg)   23 210 lb 6.4 oz (95.4 kg)         SH-    -pt no longer has an IUD in place    FH- mother alive HTN, obesity, depression, hypothyroidism   Father  2020 cholangiocarcinoma,  HTN, obesity   1 brother GERD    HM  Immunizations  Eye care  Dental care  Pap UTD        Review of Systems  Review of systems (12) negative, except noted above. No past medical history on file. Past Surgical History:   Procedure Laterality Date    HX CHOLECYSTECTOMY      HX WISDOM TEETH EXTRACTION       Social History     Socioeconomic History    Marital status:    Tobacco Use    Smoking status: Former     Passive exposure: Past    Smokeless tobacco: Never   Vaping Use    Vaping Use: Never used   Substance and Sexual Activity    Alcohol use: Yes    Drug use: Never    Sexual activity: Yes     Partners: Male     Birth control/protection: I.U.D.      Social Determinants of Health     Financial Resource Strain: Low Risk     Difficulty of Paying Living Expenses: Not hard at all   Food Insecurity: No Food Insecurity    Worried About Running Out of Food in the Last Year: Never true    Ran Out of Food in the Last Year: Never true     Family History   Problem Relation Age of Onset    OSTEOARTHRITIS Mother     Asthma Mother         Inhaler    Hypertension Mother     Psychiatric Disorder Mother     Cancer Father         Cholangiocarcinoma    Gall Bladder Disease Father     Hypertension Father OSTEOARTHRITIS Maternal Grandmother         RA    Cancer Maternal Grandmother         Uterine    Psychiatric Disorder Maternal Grandmother     Cancer Paternal Grandfather         Prostate    Diabetes Paternal Grandfather     Gall Bladder Disease Paternal Grandfather     Psychiatric Disorder Paternal Grandfather     OSTEOARTHRITIS Maternal Aunt         Lupus    Psychiatric Disorder Maternal Aunt     Gall Bladder Disease Paternal Uncle        Allergies   Allergen Reactions    Oxycodone Nausea Only and Nausea and Vomiting    Codeine Nausea Only       Objective:  Visit Vitals  BP (!) 128/91   Pulse 80   Temp 98.2 °F (36.8 °C) (Temporal)   Resp 18   Ht 5' 7\" (1.702 m)   Wt 207 lb (93.9 kg)   SpO2 100%   BMI 32.42 kg/m²     Physical Exam:   General appearance - alert, well appearing, and in no distress . Very uncomfortable. Unable to exam on table due to discomfort  Mental status - alert, oriented to person, place, and time  EYE-EOMI  Neck - supple,   Chest - symmetric air entry    Abdomen - obese  Ext-no pedal edema, no clubbing or cyanosis  Skin-Warm and dry. no hyperpigmentation, vitiligo, or suspicious lesions  Neuro -alert, oriented, normal speech          Results for orders placed or performed in visit on 04/04/22   PAP IG, RFX APTIMA HPV ASCUS ([id number])   Result Value Ref Range    Diagnosis Comment     Specimen adequacy Comment     Clinician provided ICD10 Comment     Performed by: Comment     . Juan Owen Note: Comment     Test methodology Comment     . Comment        Assessment/Plan:  No diagnosis found. No orders of the defined types were placed in this encounter. 2. Missed menses    - BETA HCG, QT; Future  - BETA HCG, QT    1. Acute left-sided low back pain without sciatica  T/c MRI in the near future  - methylPREDNISolone (MEDROL DOSEPACK) 4 mg tablet; Use as directed  Dispense: 1 Dose Pack; Refill: 0  - diazePAM (Valium) 5 mg tablet; Take 1-2 Tablets by mouth nightly as needed for Anxiety.  Max Daily Amount: 10 mg.  Dispense: 10 Tablet; Refill: 0  - lidocaine (LIDODERM) 5 %; 2 Patches by TransDERmal route every twenty-four (24) hours. Apply patch to the affected area for 12 hours a day and remove for 12 hours a day. Dispense: 30 Each; Refill: 0      There are no Patient Instructions on file for this visit. I have reviewed with the patient details of the assessment and plan and all questions were answered. Relevent patient education was performed. The most recent lab findings were reviewed with the patient. An After Visit Summary was printed and given to the patient.       Miles Bhakta MD

## 2023-03-22 NOTE — PROGRESS NOTES
PT DAILY TREATMENT NOTE - [facility] 2-15    Patient Name: Dianelys Rubio  Date:3/22/2023  : 1990  [x]  Patient  Verified  Payor: Blossom Greene / Plan: Hal Seip / Product Type: HMO /    In time: 1000  Out time: 1058  Total Treatment Time (min): 58  Total Timed Codes (min): 58  1:1 Treatment Time ( only): 48   Visit #:  5    Treatment Area: Lower back pain [M54.50]    SUBJECTIVE  Pain Level (0-10 scale): 6/10  Any medication changes, allergies to medications, adverse drug reactions, diagnosis change, or new procedure performed?: [x] No    [] Yes (see summary sheet for update)  Subjective functional status/changes:   [] No changes reported  I was sitting on a hard wooden bench last night while talking to my  and noticed increasing tension and tightness in my back and went to try to do my back exercises. I couldn't get my back to turn off so I asked my  to try to massage my back and after that my back was excruciatingly painful. I couldn't go to sleep until 5 am because the pain was so significant and I tried sleeping in multiple beds and the floor. It did radiate down to my foot on the outside last night but its not going all the way down right now.       OBJECTIVE    38 min Therapeutic Exercise:  [x] See flow sheet : reassessment   Rationale: increase ROM and increase strength to improve the patients ability to manage pain    20 min Self Care/Home Management:  [x]  See flow sheet : how to perforrn supine to sit transfers and how for her  to help her without increasing pain   Rationale:  increase awareness of triggers for back pain    to improve the patients ability to manage symptoms at home         With   [] TE   [] TA   [] Neuro   [] SC   [] other: Patient Education: [x] Review HEP    [] Progressed/Changed HEP based on:   [] positioning   [] body mechanics   [] transfers   [] heat/ice application    [] other:      Other Objective/Functional Measures:      Slump: L (+)  SLR: L (+)    Pain Level (0-10 scale) post treatment: 6-6.5/10    ASSESSMENT/Changes in Function:   Patient presents with positive neural tension testing on LLE and with her verbal report of pain, therapist is suspicious of potential disc pathology. Verbally reviewed repeated prone extension for HEP at home and recommended follow-up with her PCP. Discussed different approaches to helping her transition supine to sit and  was present for this training as well. Called and left a message for her physician in regards to change in status and assisted in scheduling for patient at 1:45 pm.   Patient will continue to benefit from skilled PT services to modify and progress therapeutic interventions, address functional mobility deficits, address ROM deficits, address strength deficits, analyze and address soft tissue restrictions, analyze and cue movement patterns, analyze and modify body mechanics/ergonomics, and assess and modify postural abnormalities to attain remaining goals. [x]  See Plan of Care  []  See progress note/recertification  []  See Discharge Summary         Progress towards goals / Updated goals:  Long Term Goals: To be accomplished in 10-12 treatments:  1) Pt will be able to sit greater than 90 minutes without increased pain at work to better engage and perform vocational tasks. 2) Pt will be able to drive greater than 30 minutes without increase of pain  3) Pt will be able to rise supine to  the morning 4 days in a row without report of pain. 4) Pt will be able to retrieve item from ground without pain and with good form consistently during treatment session. 5) Pt will be able to demonstrate improvement in FOTO score >=70/100 to indicate improvement in functional mobility. 6) Pt will be independent in progressive HEP.         PLAN  [x]  Upgrade activities as tolerated     [x]  Continue plan of care  [x]  Update interventions per flow sheet       []  Discharge due to:_  []  Other:_ Hiram Napier, PT 3/22/2023

## 2023-03-22 NOTE — PROGRESS NOTES
Benny Smith is a 28 y.o. female  HIPAA verified by two patient identifiers. Health Maintenance Due   Topic    Hepatitis C Screening     Varicella Vaccine (1 of 2 - 2-dose childhood series)    DTaP/Tdap/Td series (1 - Tdap)    COVID-19 Vaccine (4 - Booster for Jiménez Peter series)     Chief Complaint   Patient presents with    Back Pain     7/10     Visit Vitals  BP (!) 128/91   Pulse 80   Temp 98.2 °F (36.8 °C) (Temporal)   Resp 18   Ht 5' 7\" (1.702 m)   Wt 207 lb (93.9 kg)   SpO2 100%   BMI 32.42 kg/m²       Pain Scale: 7/10  Pain Location: Back  1. Have you been to the ER, urgent care clinic since your last visit? Hospitalized since your last visit? No    2. Have you seen or consulted any other health care providers outside of the [address] [place] [address] since your last visit? Include any pap smears or colon screening.  No

## 2023-03-22 NOTE — PROGRESS NOTES
History of Present Illness: Dianelys Rubio is a 34 y.o. female who presents with symptoms of depression, anxiety and grief, ADHD     Duration of session: 58 min     Mental Status exam:           Sensorium  oriented to time, place and person   Relations cooperative   Appearance:  age appropriate   Motor Behavior:  restless and within normal limits   Speech:  normal pitch and normal volume   Thought Process: goal directed   Thought Content free of delusions, free of hallucinations and not internally preoccupied    Suicidal ideations none   Homicidal ideations none   Mood:  anxious and stable   Affect:  anxious, full range, stable and mood-congruent   Memory recent  adequate   Memory remote:  adequate   Concentration:  impaired   Abstraction:  abstract   Insight:  good   Reliability good   Judgment:  good            DIAGNOSIS AND IMPRESSION:        Axis I: mdd, adhd, grief, BETTE  Axis II: No diagnosis  Axis III: History reviewed. No pertinent past medical history. Axis IV: Problems with primary support group, Occupational problems and Other psychosocial or environmental problems  Axis V:  61-70 mild symptoms        Strengths: humor, smart, hard working  Trauma: mom mdd, poor functioning when cl in childhood     Client presents via doxy vv for follow up, stable and improved space with mood. Reports physical pain and discomfort with back, reinjured self. Processed relationship with , how to better manage his ADHD and hers. Follow up next week. Dianelys Rubio (: 1990) is a 28 y.o. female, established patient, here for evaluation of the following chief complaint(s):   Follow-up and Psychotherapy (doxy)       ASSESSMENT/PLAN:  Below is the assessment and plan developed based on review of pertinent history, labs, studies, and medications. 1. Moderate episode of recurrent major depressive disorder ([facility] [address]  2. BETTE (generalized anxiety disorder)  3. Complicated grief  4.  Attention deficit hyperactivity disorder (ADHD), unspecified ADHD type      Return in about 1 week (around 3/29/2023). Lili Cardenas, was evaluated through a synchronous (real-time) audio-video encounter. The patient (or guardian if applicable) is aware that this is a billable service, which includes applicable co-pays. This Virtual Visit was conducted with patient's (and/or legal guardian's) consent. The visit was conducted pursuant to the emergency declaration under the [address] [place] [address], [address] [place] [address] authority and the [employer] and [employer] General Act. Patient identification was verified, and a caregiver was present when appropriate. The patient was located at: Home: [address] [place] [address]  The provider was located at: Home: [address] [facility] was used to authenticate this note.   -- Lacey Law LCSW

## 2023-03-23 ENCOUNTER — TELEPHONE (OUTPATIENT)
Dept: INTERNAL MEDICINE CLINIC | Age: 33
End: 2023-03-23

## 2023-03-23 NOTE — TELEPHONE ENCOUNTER
Pt called and verified. D/w pt hcg results as <3 and therefore negative by lab criteria.     Pt feels sig better after steroid and muscle relaxant    Will schedule f/up w me IF pain not improved post PT

## 2023-03-29 ENCOUNTER — VIRTUAL VISIT (OUTPATIENT)
Dept: SOCIAL WORK | Age: 33
End: 2023-03-29
Payer: COMMERCIAL

## 2023-03-29 DIAGNOSIS — F43.21 COMPLICATED GRIEF: ICD-10-CM

## 2023-03-29 DIAGNOSIS — F41.1 GAD (GENERALIZED ANXIETY DISORDER): ICD-10-CM

## 2023-03-29 DIAGNOSIS — F33.1 MODERATE EPISODE OF RECURRENT MAJOR DEPRESSIVE DISORDER (HCC): Primary | ICD-10-CM

## 2023-03-29 DIAGNOSIS — F90.9 ATTENTION DEFICIT HYPERACTIVITY DISORDER (ADHD), UNSPECIFIED ADHD TYPE: ICD-10-CM

## 2023-03-29 PROCEDURE — 90837 PSYTX W PT 60 MINUTES: CPT | Performed by: SOCIAL WORKER

## 2023-03-30 NOTE — PROGRESS NOTES
History of Present Illness: Rosita Harris is a 34 y.o. female who presents with symptoms of depression, anxiety and grief, ADHD     Duration of session: 60 min     Mental Status exam:           Sensorium  oriented to time, place and person   Relations cooperative   Appearance:  age appropriate   Motor Behavior:  restless and within normal limits   Speech:  normal pitch and normal volume   Thought Process: goal directed   Thought Content free of delusions, free of hallucinations and not internally preoccupied    Suicidal ideations none   Homicidal ideations none   Mood:  anxious and stable   Affect:  anxious, full range, stable and mood-congruent   Memory recent  adequate   Memory remote:  adequate   Concentration:  impaired   Abstraction:  abstract   Insight:  good   Reliability good   Judgment:  good            DIAGNOSIS AND IMPRESSION:        Axis I: mdd, adhd, grief, BETTE  Axis II: No diagnosis  Axis III: History reviewed. No pertinent past medical history. Axis IV: Problems with primary support group, Occupational problems and Other psychosocial or environmental problems  Axis V:  61-70 mild symptoms        Strengths: humor, smart, hard working  Trauma: mom mdd, poor functioning when cl in childhood     Client presents via doxy vv for follow up, stable and improved space with mood. Reports feeling some progress in back pain, but physical discomfort and depressive symptoms impacting marriage. Processed this and cl's insecurities. follow up next week. Rosita Harris (: 1990) is a 28 y.o. female, established patient, here for evaluation of the following chief complaint(s):   Follow-up and Psychotherapy (doxy)       ASSESSMENT/PLAN:  Below is the assessment and plan developed based on review of pertinent history, labs, studies, and medications. 1. Moderate episode of recurrent major depressive disorder ([facility] [address]  2. BETTE (generalized anxiety disorder)  3. Complicated grief  4.  Attention deficit hyperactivity disorder (ADHD), unspecified ADHD type      Return in about 1 week (around 4/5/2023). Ova Lone, was evaluated through a synchronous (real-time) audio-video encounter. The patient (or guardian if applicable) is aware that this is a billable service, which includes applicable co-pays. This Virtual Visit was conducted with patient's (and/or legal guardian's) consent. The visit was conducted pursuant to the emergency declaration under the [address] [place] [address], [address] [place] [address] authority and the [employer] General Act. Patient identification was verified, and a caregiver was present when appropriate. The patient was located at: Home: [address] [place] [address]  The provider was located at: [facility]: [address] [facility] was used to authenticate this note.   -- Taj Craven LCSW

## 2023-03-31 ENCOUNTER — HOSPITAL ENCOUNTER (OUTPATIENT)
Dept: PHYSICAL THERAPY | Age: 33
End: 2023-03-31
Payer: COMMERCIAL

## 2023-03-31 PROCEDURE — 97112 NEUROMUSCULAR REEDUCATION: CPT

## 2023-03-31 PROCEDURE — 97110 THERAPEUTIC EXERCISES: CPT
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
## 2023-03-31 NOTE — PROGRESS NOTES
PT DAILY TREATMENT NOTE - [facility] -15    Patient Name: Author Burciaga  Date:3/31/2023  : 1990  [x]  Patient  Verified  Payor: Ida Mccormickaw / Plan: Abbey Kumar / Product Type: HMO /    In time:   Out time: 957  Total Treatment Time (min): 54  Total Timed Codes (min): 54  1:1 Treatment Time ( only): 48   Visit #:  6    Treatment Area: Lower back pain [M54.50]    SUBJECTIVE  Pain Level (0-10 scale): 3/10  Any medication changes, allergies to medications, adverse drug reactions, diagnosis change, or new procedure performed?: [x] No    [] Yes (see summary sheet for update)  Subjective functional status/changes:   [] No changes reported  The steroid really made a difference. I went to [facility] and tried to mostly walk and that seem to be ok for the most part. OBJECTIVE    25 min Therapeutic Exercise:  [x] See flow sheet :   Rationale: increase ROM and increase strength to improve the patients ability to sit without pain     29 min Neuromuscular Re-education:  [x]  See flow sheet : SWB dead bug, basic multifidi, supine clamshells   Rationale: increase ROM, increase strength, improve coordination, and increase proprioception  to improve the patients ability to engage core with            With   [] TE   [] TA   [] Neuro   [] SC   [] other: Patient Education: [x] Review HEP    [] Progressed/Changed HEP based on:   [] positioning   [] body mechanics   [] transfers   [] heat/ice application    [] other:      Other Objective/Functional Measures:      Pain Level (0-10 scale) post treatment: 2-3/10    ASSESSMENT/Changes in Function:   Extensive review of core and hip stabilization exercises for her HEP.    Patient will continue to benefit from skilled PT services to modify and progress therapeutic interventions, address functional mobility deficits, address ROM deficits, address strength deficits, analyze and address soft tissue restrictions, analyze and cue movement patterns, analyze and modify body mechanics/ergonomics, and assess and modify postural abnormalities to attain remaining goals.      []  See Plan of Care  []  See progress note/recertification  [x]  See Discharge Summary               PLAN  []  Upgrade activities as tolerated     []  Continue plan of care  []  Update interventions per flow sheet       [x]  Discharge due to: insurance authorization    []  Other:_      Brynn Greenberg, PT 3/31/2023

## 2023-04-05 ENCOUNTER — VIRTUAL VISIT (OUTPATIENT)
Dept: SOCIAL WORK | Age: 33
End: 2023-04-05
Payer: COMMERCIAL

## 2023-04-05 PROCEDURE — 90837 PSYTX W PT 60 MINUTES: CPT | Performed by: SOCIAL WORKER

## 2023-04-05 NOTE — PROGRESS NOTES
History of Present Illness: Vilma Quintanilla is a 34 y.o. female who presents with symptoms of depression, anxiety and grief, ADHD     Duration of session:  60 min     Mental Status exam:           Sensorium  oriented to time, place and person   Relations cooperative   Appearance:  age appropriate   Motor Behavior:  restless and within normal limits   Speech:  normal pitch and normal volume   Thought Process: goal directed   Thought Content free of delusions, free of hallucinations and not internally preoccupied    Suicidal ideations none   Homicidal ideations none   Mood:  anxious and stable   Affect:  anxious, full range, stable and mood-congruent   Memory recent  adequate   Memory remote:  adequate   Concentration:  impaired   Abstraction:  abstract   Insight:  good   Reliability good   Judgment:  good            DIAGNOSIS AND IMPRESSION:        Axis I: mdd, adhd, grief, BETTE  Axis II: No diagnosis  Axis III: History reviewed. No pertinent past medical history. Axis IV: Problems with primary support group, Occupational problems and Other psychosocial or environmental problems  Axis V:  61-70 mild symptoms        Strengths: humor, smart, hard working  Trauma: mom mdd, poor functioning when cl in childhood     Client presents via doxy vv for follow up, stable but reports increased anxiety of late, work involved but also grief response as April/May anniversary of father's death and demise. Client more open and processing her grief, told story of his demise and her support/caregiver role in this and within family dynamic.  follow up next week. Vilma Quintanilla (: 1990) is a 28 y.o. female, established patient, here for evaluation of the following chief complaint(s):   Follow-up and Psychotherapy (Doxy, telephone)       ASSESSMENT/PLAN:  Below is the assessment and plan developed based on review of pertinent history, labs, studies, and medications.     1. Moderate episode of recurrent major depressive disorder ([facility] [address]  2. BETTE (generalized anxiety disorder)  3. Complicated grief  4. Attention deficit hyperactivity disorder (ADHD), unspecified ADHD type      Return in about 1 week (around 4/12/2023). Dillon Vyas, was evaluated through a synchronous (real-time) audio-video encounter. The patient (or guardian if applicable) is aware that this is a billable service, which includes applicable co-pays. This Virtual Visit was conducted with patient's (and/or legal guardian's) consent. The visit was conducted pursuant to the emergency declaration under the [facility], [address] [place] [address] authority and the [employer] and [employer] General Act. Patient identification was verified, and a caregiver was present when appropriate. The patient was located at: Home: [address] [place] [address]  The provider was located at: [facility]: [address] [facility] was used to authenticate this note.   -- Phil Diaz LCSW

## 2023-04-18 ENCOUNTER — VIRTUAL VISIT (OUTPATIENT)
Dept: BEHAVIORAL/MENTAL HEALTH CLINIC | Age: 33
End: 2023-04-18

## 2023-04-18 DIAGNOSIS — F90.9 ATTENTION DEFICIT HYPERACTIVITY DISORDER (ADHD), UNSPECIFIED ADHD TYPE: ICD-10-CM

## 2023-04-18 DIAGNOSIS — F43.21 COMPLICATED GRIEF: ICD-10-CM

## 2023-04-18 DIAGNOSIS — F33.1 MODERATE EPISODE OF RECURRENT MAJOR DEPRESSIVE DISORDER (HCC): Primary | ICD-10-CM

## 2023-04-18 DIAGNOSIS — F41.1 GAD (GENERALIZED ANXIETY DISORDER): ICD-10-CM

## 2023-04-19 NOTE — PROGRESS NOTES
History of Present Illness: Jania Chew is a 34 y.o. female who presents with symptoms of depression, anxiety and grief, ADHD     Duration of session:  60 min     Mental Status exam:           Sensorium  oriented to time, place and person   Relations cooperative   Appearance:  age appropriate   Motor Behavior:  restless and within normal limits   Speech:  normal pitch and normal volume   Thought Process: goal directed   Thought Content free of delusions, free of hallucinations and not internally preoccupied    Suicidal ideations none   Homicidal ideations none   Mood:  anxious and stable   Affect:  anxious, full range, stable and mood-congruent   Memory recent  adequate   Memory remote:  adequate   Concentration:  impaired   Abstraction:  abstract   Insight:  good   Reliability good   Judgment:  good            DIAGNOSIS AND IMPRESSION:        Axis I: mdd, adhd, grief, BETTE  Axis II: No diagnosis  Axis III: History reviewed. No pertinent past medical history. Axis IV: Problems with primary support group, Occupational problems and Other psychosocial or environmental problems  Axis V:  61-70 mild symptoms        Strengths: humor, smart, hard working  Trauma: mom mdd, poor functioning when cl in childhood     Client presents via doxy vv for follow up, stable, improved mood. Reports mom broke her ankle and appts are far off. Cl is main caregiver though mom has other supports. Processed more in depth dynamic with mom and cl as parentified. Follow up next week. Jania Chew (: 1990) is a 28 y.o. female, established patient, here for evaluation of the following chief complaint(s):   Follow-up and Psychotherapy (doxy)       ASSESSMENT/PLAN:  Below is the assessment and plan developed based on review of pertinent history, labs, studies, and medications. 1. Moderate episode of recurrent major depressive disorder ([facility] [address]  2. BETTE (generalized anxiety disorder)  3. Complicated grief  4.  Attention deficit hyperactivity disorder (ADHD), unspecified ADHD type      Return in about 1 week (around 4/25/2023). Saray Mcghee, was evaluated through a synchronous (real-time) audio-video encounter. The patient (or guardian if applicable) is aware that this is a billable service, which includes applicable co-pays. This Virtual Visit was conducted with patient's (and/or legal guardian's) consent. The visit was conducted pursuant to the emergency declaration under the [address] [place] [address], [address] [place] [address] authority and the [employer] General Act. Patient identification was verified, and a caregiver was present when appropriate. The patient was located at: Home: [address] [place] [address]  The provider was located at: Home: [address] [facility] was used to authenticate this note.   -- Surya Evangelista LCSW

## 2023-04-27 RX ORDER — DEXTROAMPHETAMINE SACCHARATE, AMPHETAMINE ASPARTATE, DEXTROAMPHETAMINE SULFATE AND AMPHETAMINE SULFATE 2.5; 2.5; 2.5; 2.5 MG/1; MG/1; MG/1; MG/1
TABLET ORAL
COMMUNITY

## 2023-04-27 RX ORDER — DEXTROAMPHETAMINE SACCHARATE, AMPHETAMINE ASPARTATE MONOHYDRATE, DEXTROAMPHETAMINE SULFATE AND AMPHETAMINE SULFATE 3.75; 3.75; 3.75; 3.75 MG/1; MG/1; MG/1; MG/1
15 CAPSULE, EXTENDED RELEASE ORAL
COMMUNITY
End: 2023-06-22 | Stop reason: ALTCHOICE

## 2023-04-27 RX ORDER — LIDOCAINE 50 MG/G
2 PATCH TOPICAL EVERY 24 HOURS
COMMUNITY
Start: 2023-03-22

## 2023-04-27 RX ORDER — METHYLPREDNISOLONE 4 MG/1
TABLET ORAL
COMMUNITY
Start: 2023-03-22

## 2023-04-27 RX ORDER — DIAZEPAM 5 MG/1
5-10 TABLET ORAL
COMMUNITY
Start: 2023-03-22

## 2023-04-27 RX ORDER — BUSPIRONE HYDROCHLORIDE 7.5 MG/1
10 TABLET ORAL 3 TIMES DAILY
COMMUNITY
Start: 2023-03-14 | End: 2023-06-22

## 2023-04-27 RX ORDER — FEXOFENADINE HCL 180 MG/1
180 TABLET ORAL DAILY PRN
COMMUNITY

## 2023-04-27 RX ORDER — PROGESTERONE 100 MG/1
CAPSULE ORAL
COMMUNITY
Start: 2023-02-10

## 2023-04-27 RX ORDER — QUETIAPINE FUMARATE 100 MG/1
50 TABLET, FILM COATED ORAL
COMMUNITY
Start: 2023-03-14 | End: 2023-06-22

## 2023-05-02 ENCOUNTER — OFFICE VISIT (OUTPATIENT)
Dept: BEHAVIORAL/MENTAL HEALTH CLINIC | Age: 33
End: 2023-05-02
Payer: COMMERCIAL

## 2023-05-02 VITALS — HEART RATE: 79 BPM | SYSTOLIC BLOOD PRESSURE: 101 MMHG | DIASTOLIC BLOOD PRESSURE: 73 MMHG

## 2023-05-02 DIAGNOSIS — F90.9 ATTENTION DEFICIT HYPERACTIVITY DISORDER (ADHD), UNSPECIFIED ADHD TYPE: ICD-10-CM

## 2023-05-02 DIAGNOSIS — F41.1 GAD (GENERALIZED ANXIETY DISORDER): ICD-10-CM

## 2023-05-02 DIAGNOSIS — F43.21 COMPLICATED GRIEF: ICD-10-CM

## 2023-05-02 DIAGNOSIS — F33.1 MODERATE EPISODE OF RECURRENT MAJOR DEPRESSIVE DISORDER (HCC): Primary | ICD-10-CM

## 2023-05-02 PROCEDURE — 99214 OFFICE O/P EST MOD 30 MIN: CPT | Performed by: NURSE PRACTITIONER

## 2023-05-02 RX ORDER — BUSPIRONE HYDROCHLORIDE 7.5 MG/1
7.5 TABLET ORAL 3 TIMES DAILY
Qty: 90 TABLET | Refills: 1 | Status: SHIPPED | OUTPATIENT
Start: 2023-05-02

## 2023-05-03 ENCOUNTER — VIRTUAL VISIT (OUTPATIENT)
Dept: SOCIAL WORK | Age: 33
End: 2023-05-03
Payer: COMMERCIAL

## 2023-05-03 DIAGNOSIS — F41.1 GAD (GENERALIZED ANXIETY DISORDER): ICD-10-CM

## 2023-05-03 DIAGNOSIS — F90.9 ATTENTION DEFICIT HYPERACTIVITY DISORDER (ADHD), UNSPECIFIED ADHD TYPE: ICD-10-CM

## 2023-05-03 DIAGNOSIS — F33.1 MODERATE EPISODE OF RECURRENT MAJOR DEPRESSIVE DISORDER (HCC): Primary | ICD-10-CM

## 2023-05-03 DIAGNOSIS — F43.21 COMPLICATED GRIEF: ICD-10-CM

## 2023-05-03 PROCEDURE — 90837 PSYTX W PT 60 MINUTES: CPT | Performed by: SOCIAL WORKER

## 2023-06-22 ENCOUNTER — OFFICE VISIT (OUTPATIENT)
Age: 33
End: 2023-06-22
Payer: COMMERCIAL

## 2023-06-22 VITALS
DIASTOLIC BLOOD PRESSURE: 76 MMHG | WEIGHT: 217 LBS | BODY MASS INDEX: 34.06 KG/M2 | HEIGHT: 67 IN | RESPIRATION RATE: 17 BRPM | OXYGEN SATURATION: 98 % | TEMPERATURE: 97.9 F | HEART RATE: 87 BPM | SYSTOLIC BLOOD PRESSURE: 119 MMHG

## 2023-06-22 DIAGNOSIS — F39 UNSPECIFIED MOOD (AFFECTIVE) DISORDER (HCC): ICD-10-CM

## 2023-06-22 DIAGNOSIS — F90.2 ATTENTION DEFICIT HYPERACTIVITY DISORDER (ADHD), COMBINED TYPE: Primary | ICD-10-CM

## 2023-06-22 DIAGNOSIS — F41.1 GAD (GENERALIZED ANXIETY DISORDER): ICD-10-CM

## 2023-06-22 PROCEDURE — 99214 OFFICE O/P EST MOD 30 MIN: CPT | Performed by: NURSE PRACTITIONER

## 2023-06-22 RX ORDER — QUETIAPINE FUMARATE 50 MG/1
50 TABLET, FILM COATED ORAL NIGHTLY
Qty: 30 TABLET | Refills: 5 | Status: SHIPPED | OUTPATIENT
Start: 2023-06-22

## 2023-06-22 RX ORDER — BUSPIRONE HYDROCHLORIDE 7.5 MG/1
7.5 TABLET ORAL 3 TIMES DAILY
Qty: 90 TABLET | Refills: 5 | Status: SHIPPED | OUTPATIENT
Start: 2023-06-22

## 2023-06-22 RX ORDER — METHYLPHENIDATE HYDROCHLORIDE 20 MG/1
20 TABLET ORAL DAILY
Qty: 30 TABLET | Refills: 0 | Status: SHIPPED | OUTPATIENT
Start: 2023-06-22 | End: 2023-07-22

## 2023-06-22 ASSESSMENT — PATIENT HEALTH QUESTIONNAIRE - PHQ9
8. MOVING OR SPEAKING SO SLOWLY THAT OTHER PEOPLE COULD HAVE NOTICED. OR THE OPPOSITE, BEING SO FIGETY OR RESTLESS THAT YOU HAVE BEEN MOVING AROUND A LOT MORE THAN USUAL: 1
3. TROUBLE FALLING OR STAYING ASLEEP: 2
10. IF YOU CHECKED OFF ANY PROBLEMS, HOW DIFFICULT HAVE THESE PROBLEMS MADE IT FOR YOU TO DO YOUR WORK, TAKE CARE OF THINGS AT HOME, OR GET ALONG WITH OTHER PEOPLE: 2
2. FEELING DOWN, DEPRESSED OR HOPELESS: 1
7. TROUBLE CONCENTRATING ON THINGS, SUCH AS READING THE NEWSPAPER OR WATCHING TELEVISION: 2
1. LITTLE INTEREST OR PLEASURE IN DOING THINGS: 2
SUM OF ALL RESPONSES TO PHQ QUESTIONS 1-9: 15
SUM OF ALL RESPONSES TO PHQ QUESTIONS 1-9: 15
4. FEELING TIRED OR HAVING LITTLE ENERGY: 3
5. POOR APPETITE OR OVEREATING: 1
SUM OF ALL RESPONSES TO PHQ9 QUESTIONS 1 & 2: 3
6. FEELING BAD ABOUT YOURSELF - OR THAT YOU ARE A FAILURE OR HAVE LET YOURSELF OR YOUR FAMILY DOWN: 3
SUM OF ALL RESPONSES TO PHQ QUESTIONS 1-9: 15
9. THOUGHTS THAT YOU WOULD BE BETTER OFF DEAD, OR OF HURTING YOURSELF: 0
SUM OF ALL RESPONSES TO PHQ QUESTIONS 1-9: 15

## 2023-06-22 NOTE — PROGRESS NOTES
CHIEF COMPLAINT:  Sabino Terrell is a 28 y.o. female and was seen today for follow-up of psychiatric condition and psychotropic medication management. HPI:    Donny Stein reports the following psychiatric symptoms by hx:  depression, anxiety, and intention deficit . Overall symptoms have been present for years. Currently symptoms are of moderate severity. The symptoms occur daily. Met with pt for appt today to review current treatment plan. FAMILY/SOCIAL HX: External Stressors      REVIEW OF SYSTEMS:  Psychiatric symptoms being monitored for:  depression, ADHD, anxiety   Appetite:decreased   Sleep: increased more than normal   Neuro: denies      /76 (Site: Left Upper Arm, Position: Sitting, Cuff Size: Large Adult)   Pulse 87   Temp 97.9 °F (36.6 °C) (Temporal)   Resp 17   Ht 5' 7\" (1.702 m)   Wt 217 lb (98.4 kg)   SpO2 98%   BMI 33.99 kg/m²     Side Effects:  none    MENTAL STATUS EXAM:   Sensorium  oriented to time, place and person   Relations cooperative   Appearance:  age appropriate and within normal Limits   Motor Behavior:  within normal limits   Speech:  normal pitch and normal volume   Thought Process: within normal limits   Thought Content free of delusions and free of hallucinations   Suicidal ideations none   Homicidal ideations none   Mood:  anxious and depressed   Affect:  anxious and depressed   Memory recent  adequate   Memory remote:  adequate   Concentration:  impaired   Abstraction:  abstract   Insight:  good   Reliability good   Judgment:  good        MEDICAL DECISION MAKING:  Problems addressed today:   Diagnosis Orders   1. Attention deficit hyperactivity disorder (ADHD), combined type  methylphenidate (RITALIN) 20 MG tablet      2. JOE (generalized anxiety disorder)  busPIRone (BUSPAR) 7.5 MG tablet      3. Unspecified mood (affective) disorder (HCC)  QUEtiapine (SEROQUEL) 50 MG tablet            Assessment:   Donny Stein is  responding to treatment.  Symptoms are slightly

## 2023-06-22 NOTE — PROGRESS NOTES
Chief Complaint   Patient presents with    Medication Management     Vitals:    06/22/23 1408   BP: 119/76   Site: Left Upper Arm   Position: Sitting   Cuff Size: Large Adult   Pulse: 87   Resp: 17   Temp: 97.9 °F (36.6 °C)   TempSrc: Temporal   SpO2: 98%   Weight: 217 lb (98.4 kg)   Height: 5' 7\" (1.702 m)     Pain Score: Zero    Prior to Admission medications    Medication Sig Start Date End Date Taking? Authorizing Provider   Prenatal MV-Min-Fe Fum-FA-DHA (PRENATAL+DHA PO) Take by mouth   Yes Historical Provider, MD   amphetamine-dextroamphetamine (ADDERALL) 10 MG tablet dextroamphetamine-amphetamine 10 mg tablet   Yes Ar Automatic Reconciliation   busPIRone (BUSPAR) 7.5 MG tablet Take 10 mg by mouth 3 times daily Patient states she is taking 7.5mg 3/14/23  Yes Ar Automatic Reconciliation   fexofenadine (ALLEGRA) 180 MG tablet Take 1 tablet by mouth daily as needed   Yes Ar Automatic Reconciliation   QUEtiapine (SEROQUEL) 100 MG tablet Take 0.5 tablets by mouth 3/14/23  Yes Ar Automatic Reconciliation   Sod Fluoride-Potassium Nitrate 1.1-5 % PSTE ceived the following from [facility]: Outside name: sodium fluoride-pot nitrate 1.1-5 % pste 3/13/22  Yes Ar Automatic Reconciliation   amphetamine-dextroamphetamine (ADDERALL XR) 15 MG extended release capsule Take 1 capsule by mouth. Patient not taking: Reported on 6/22/2023    Ar Automatic Reconciliation   diazePAM (VALIUM) 5 MG tablet Take 1-2 tablets by mouth.   Patient not taking: Reported on 6/22/2023 3/22/23   Ar Automatic Reconciliation   lidocaine (LIDODERM) 5 % Place 2 patches onto the skin every 24 hours  Patient not taking: Reported on 6/22/2023 3/22/23   Ar Automatic Reconciliation   methylPREDNISolone (MEDROL DOSEPACK) 4 MG tablet Use as directed  Patient not taking: Reported on 6/22/2023 3/22/23   Ar Automatic Reconciliation   progesterone (PROMETRIUM) 100 MG CAPS capsule Place one tablet vaginally daily day 14 of menses for 2

## 2023-07-06 DIAGNOSIS — F90.2 ATTENTION DEFICIT HYPERACTIVITY DISORDER (ADHD), COMBINED TYPE: Primary | ICD-10-CM

## 2023-07-06 NOTE — PROGRESS NOTES
Spoke with patient over the phone. Patient reports Ritalin was not effective at all. Will d/ce and send Vyvanse 20 mg sent to pharmacy. Educated patient on risks vs benefits and side effects. Patient acknowledged. Will continue to monitor.

## 2023-07-12 ENCOUNTER — OFFICE VISIT (OUTPATIENT)
Facility: CLINIC | Age: 33
End: 2023-07-12
Payer: COMMERCIAL

## 2023-07-12 VITALS
RESPIRATION RATE: 18 BRPM | WEIGHT: 206.9 LBS | SYSTOLIC BLOOD PRESSURE: 113 MMHG | DIASTOLIC BLOOD PRESSURE: 73 MMHG | HEART RATE: 93 BPM | HEIGHT: 67 IN | TEMPERATURE: 98.3 F | OXYGEN SATURATION: 97 % | BODY MASS INDEX: 32.47 KG/M2

## 2023-07-12 DIAGNOSIS — K52.9 GASTROENTERITIS: Primary | ICD-10-CM

## 2023-07-12 PROCEDURE — 99213 OFFICE O/P EST LOW 20 MIN: CPT | Performed by: INTERNAL MEDICINE

## 2023-07-12 ASSESSMENT — PATIENT HEALTH QUESTIONNAIRE - PHQ9
SUM OF ALL RESPONSES TO PHQ QUESTIONS 1-9: 2
1. LITTLE INTEREST OR PLEASURE IN DOING THINGS: 1
SUM OF ALL RESPONSES TO PHQ QUESTIONS 1-9: 2
SUM OF ALL RESPONSES TO PHQ QUESTIONS 1-9: 2
SUM OF ALL RESPONSES TO PHQ9 QUESTIONS 1 & 2: 2
2. FEELING DOWN, DEPRESSED OR HOPELESS: 1
SUM OF ALL RESPONSES TO PHQ QUESTIONS 1-9: 2

## 2023-07-31 ENCOUNTER — TELEMEDICINE (OUTPATIENT)
Age: 33
End: 2023-07-31
Payer: COMMERCIAL

## 2023-07-31 DIAGNOSIS — F90.2 ATTENTION DEFICIT HYPERACTIVITY DISORDER (ADHD), COMBINED TYPE: ICD-10-CM

## 2023-07-31 PROBLEM — F90.0 ADHD (ATTENTION DEFICIT HYPERACTIVITY DISORDER), INATTENTIVE TYPE: Status: ACTIVE | Noted: 2022-03-02

## 2023-07-31 PROCEDURE — 99214 OFFICE O/P EST MOD 30 MIN: CPT | Performed by: NURSE PRACTITIONER

## 2023-07-31 ASSESSMENT — PATIENT HEALTH QUESTIONNAIRE - PHQ9
3. TROUBLE FALLING OR STAYING ASLEEP: 1
SUM OF ALL RESPONSES TO PHQ QUESTIONS 1-9: 2
2. FEELING DOWN, DEPRESSED OR HOPELESS: 1
SUM OF ALL RESPONSES TO PHQ QUESTIONS 1-9: 2

## 2023-07-31 NOTE — PROGRESS NOTES
CHIEF COMPLAINT:  Rohan Mane is a 28 y.o. female and was seen today for follow-up of psychiatric condition and psychotropic medication management. HPI:    Jeanine Luna reports the following psychiatric symptoms by hx:  depression, anxiety, and intention deficit . Overall symptoms have been present for years. Currently symptoms are of moderate severity. The symptoms occur daily. Met with pt via telehealth video for appt today to review current treatment plan. FAMILY/SOCIAL HX: External Stressors      REVIEW OF SYSTEMS:  Psychiatric symptoms being monitored for:  depression, ADHD, anxiety   Appetite:decreased   Sleep: increased more than normal   Neuro: denies     Side Effects:   dizziness    MENTAL STATUS EXAM:   Sensorium  oriented to time, place and person   Relations cooperative   Appearance:  age appropriate and within normal Limits   Motor Behavior:  within normal limits   Speech:  normal pitch and normal volume   Thought Process: within normal limits   Thought Content free of delusions and free of hallucinations   Suicidal ideations none   Homicidal ideations none   Mood:  anxious and depressed   Affect:  anxious and depressed   Memory recent  adequate   Memory remote:  adequate   Concentration:  impaired   Abstraction:  abstract   Insight:  good   Reliability good   Judgment:  good      MEDICAL DECISION MAKING:  Problems addressed today:   Diagnosis Orders   1. Attention deficit hyperactivity disorder (ADHD), combined type  lisdexamfetamine (VYVANSE) 30 MG capsule    lisdexamfetamine (VYVANSE) 30 MG capsule    lisdexamfetamine (VYVANSE) 30 MG capsule          Assessment:   Jeanine Luna is responding to treatment, but she continues to struggle with impaired concentration, forgetfulness, and being distracted. She reports feeling slight dizziness after taking Buspar, but doesn't want to change the dosage. She states the symptoms are transient and usually subsides.  Anxiety symptoms are exacerbated due to

## 2023-10-25 ENCOUNTER — OFFICE VISIT (OUTPATIENT)
Age: 33
End: 2023-10-25
Payer: COMMERCIAL

## 2023-10-25 VITALS
TEMPERATURE: 98.2 F | DIASTOLIC BLOOD PRESSURE: 75 MMHG | RESPIRATION RATE: 17 BRPM | HEIGHT: 68 IN | HEART RATE: 81 BPM | BODY MASS INDEX: 33.89 KG/M2 | WEIGHT: 223.6 LBS | SYSTOLIC BLOOD PRESSURE: 106 MMHG | OXYGEN SATURATION: 98 %

## 2023-10-25 DIAGNOSIS — F90.2 ATTENTION DEFICIT HYPERACTIVITY DISORDER (ADHD), COMBINED TYPE: ICD-10-CM

## 2023-10-25 DIAGNOSIS — F41.1 GAD (GENERALIZED ANXIETY DISORDER): ICD-10-CM

## 2023-10-25 DIAGNOSIS — F33.1 MAJOR DEPRESSIVE DISORDER, RECURRENT, MODERATE (HCC): Primary | ICD-10-CM

## 2023-10-25 PROCEDURE — 90833 PSYTX W PT W E/M 30 MIN: CPT | Performed by: NURSE PRACTITIONER

## 2023-10-25 PROCEDURE — 99214 OFFICE O/P EST MOD 30 MIN: CPT | Performed by: NURSE PRACTITIONER

## 2023-10-25 RX ORDER — HYDROXYZINE HYDROCHLORIDE 10 MG/1
10 TABLET, FILM COATED ORAL 3 TIMES DAILY PRN
Qty: 90 TABLET | Refills: 1 | Status: SHIPPED | OUTPATIENT
Start: 2023-10-25 | End: 2023-12-25

## 2023-10-25 RX ORDER — QUETIAPINE FUMARATE 100 MG/1
100 TABLET, FILM COATED ORAL NIGHTLY
Qty: 30 TABLET | Refills: 1 | Status: SHIPPED | OUTPATIENT
Start: 2023-10-25 | End: 2023-12-24

## 2023-10-25 ASSESSMENT — PATIENT HEALTH QUESTIONNAIRE - PHQ9
SUM OF ALL RESPONSES TO PHQ QUESTIONS 1-9: 17
8. MOVING OR SPEAKING SO SLOWLY THAT OTHER PEOPLE COULD HAVE NOTICED. OR THE OPPOSITE, BEING SO FIGETY OR RESTLESS THAT YOU HAVE BEEN MOVING AROUND A LOT MORE THAN USUAL: 1
SUM OF ALL RESPONSES TO PHQ QUESTIONS 1-9: 17
1. LITTLE INTEREST OR PLEASURE IN DOING THINGS: 2
5. POOR APPETITE OR OVEREATING: 0
10. IF YOU CHECKED OFF ANY PROBLEMS, HOW DIFFICULT HAVE THESE PROBLEMS MADE IT FOR YOU TO DO YOUR WORK, TAKE CARE OF THINGS AT HOME, OR GET ALONG WITH OTHER PEOPLE: 2
2. FEELING DOWN, DEPRESSED OR HOPELESS: 2
4. FEELING TIRED OR HAVING LITTLE ENERGY: 3
9. THOUGHTS THAT YOU WOULD BE BETTER OFF DEAD, OR OF HURTING YOURSELF: 0
6. FEELING BAD ABOUT YOURSELF - OR THAT YOU ARE A FAILURE OR HAVE LET YOURSELF OR YOUR FAMILY DOWN: 3
SUM OF ALL RESPONSES TO PHQ QUESTIONS 1-9: 17
3. TROUBLE FALLING OR STAYING ASLEEP: 3
SUM OF ALL RESPONSES TO PHQ9 QUESTIONS 1 & 2: 4
7. TROUBLE CONCENTRATING ON THINGS, SUCH AS READING THE NEWSPAPER OR WATCHING TELEVISION: 3
SUM OF ALL RESPONSES TO PHQ QUESTIONS 1-9: 17

## 2023-10-25 ASSESSMENT — ANXIETY QUESTIONNAIRES
5. BEING SO RESTLESS THAT IT IS HARD TO SIT STILL: 1
2. NOT BEING ABLE TO STOP OR CONTROL WORRYING: 3
3. WORRYING TOO MUCH ABOUT DIFFERENT THINGS: 3
6. BECOMING EASILY ANNOYED OR IRRITABLE: 3
4. TROUBLE RELAXING: 3
GAD7 TOTAL SCORE: 19
7. FEELING AFRAID AS IF SOMETHING AWFUL MIGHT HAPPEN: 3
IF YOU CHECKED OFF ANY PROBLEMS ON THIS QUESTIONNAIRE, HOW DIFFICULT HAVE THESE PROBLEMS MADE IT FOR YOU TO DO YOUR WORK, TAKE CARE OF THINGS AT HOME, OR GET ALONG WITH OTHER PEOPLE: VERY DIFFICULT
1. FEELING NERVOUS, ANXIOUS, OR ON EDGE: 3

## 2024-01-30 ENCOUNTER — OFFICE VISIT (OUTPATIENT)
Age: 34
End: 2024-01-30
Payer: COMMERCIAL

## 2024-01-30 VITALS
DIASTOLIC BLOOD PRESSURE: 79 MMHG | SYSTOLIC BLOOD PRESSURE: 122 MMHG | HEIGHT: 67 IN | TEMPERATURE: 97.7 F | OXYGEN SATURATION: 98 % | WEIGHT: 237.6 LBS | HEART RATE: 85 BPM | RESPIRATION RATE: 16 BRPM | BODY MASS INDEX: 37.29 KG/M2

## 2024-01-30 DIAGNOSIS — F33.1 MAJOR DEPRESSIVE DISORDER, RECURRENT, MODERATE (HCC): Primary | ICD-10-CM

## 2024-01-30 DIAGNOSIS — F41.1 GAD (GENERALIZED ANXIETY DISORDER): ICD-10-CM

## 2024-01-30 PROCEDURE — 99214 OFFICE O/P EST MOD 30 MIN: CPT | Performed by: NURSE PRACTITIONER

## 2024-01-30 RX ORDER — LETROZOLE 2.5 MG/1
2.5 TABLET, FILM COATED ORAL DAILY
COMMUNITY

## 2024-01-30 RX ORDER — BUSPIRONE HYDROCHLORIDE 7.5 MG/1
7.5 TABLET ORAL 3 TIMES DAILY
COMMUNITY

## 2024-01-30 ASSESSMENT — PATIENT HEALTH QUESTIONNAIRE - PHQ9
6. FEELING BAD ABOUT YOURSELF - OR THAT YOU ARE A FAILURE OR HAVE LET YOURSELF OR YOUR FAMILY DOWN: 2
8. MOVING OR SPEAKING SO SLOWLY THAT OTHER PEOPLE COULD HAVE NOTICED. OR THE OPPOSITE, BEING SO FIGETY OR RESTLESS THAT YOU HAVE BEEN MOVING AROUND A LOT MORE THAN USUAL: 0
4. FEELING TIRED OR HAVING LITTLE ENERGY: 2
SUM OF ALL RESPONSES TO PHQ QUESTIONS 1-9: 9
2. FEELING DOWN, DEPRESSED OR HOPELESS: 1
SUM OF ALL RESPONSES TO PHQ QUESTIONS 1-9: 9
9. THOUGHTS THAT YOU WOULD BE BETTER OFF DEAD, OR OF HURTING YOURSELF: 0
10. IF YOU CHECKED OFF ANY PROBLEMS, HOW DIFFICULT HAVE THESE PROBLEMS MADE IT FOR YOU TO DO YOUR WORK, TAKE CARE OF THINGS AT HOME, OR GET ALONG WITH OTHER PEOPLE: 2
SUM OF ALL RESPONSES TO PHQ QUESTIONS 1-9: 9
1. LITTLE INTEREST OR PLEASURE IN DOING THINGS: 2
7. TROUBLE CONCENTRATING ON THINGS, SUCH AS READING THE NEWSPAPER OR WATCHING TELEVISION: 1
SUM OF ALL RESPONSES TO PHQ QUESTIONS 1-9: 9
5. POOR APPETITE OR OVEREATING: 1
SUM OF ALL RESPONSES TO PHQ9 QUESTIONS 1 & 2: 3

## 2024-01-30 ASSESSMENT — ANXIETY QUESTIONNAIRES
6. BECOMING EASILY ANNOYED OR IRRITABLE: 1
5. BEING SO RESTLESS THAT IT IS HARD TO SIT STILL: 0
1. FEELING NERVOUS, ANXIOUS, OR ON EDGE: 3
4. TROUBLE RELAXING: 2
GAD7 TOTAL SCORE: 11
IF YOU CHECKED OFF ANY PROBLEMS ON THIS QUESTIONNAIRE, HOW DIFFICULT HAVE THESE PROBLEMS MADE IT FOR YOU TO DO YOUR WORK, TAKE CARE OF THINGS AT HOME, OR GET ALONG WITH OTHER PEOPLE: VERY DIFFICULT
7. FEELING AFRAID AS IF SOMETHING AWFUL MIGHT HAPPEN: 2
2. NOT BEING ABLE TO STOP OR CONTROL WORRYING: 2
3. WORRYING TOO MUCH ABOUT DIFFERENT THINGS: 1

## 2024-01-30 NOTE — PROGRESS NOTES
Chief Complaint   Patient presents with    Medication Management     Vitals:    01/30/24 1029   BP: 122/79   Site: Left Upper Arm   Position: Sitting   Cuff Size: Large Adult   Pulse: 85   Resp: 16   Temp: 97.7 °F (36.5 °C)   TempSrc: Temporal   SpO2: 98%   Weight: 107.8 kg (237 lb 9.6 oz)   Height: 1.702 m (5' 7\")     Pain Score: Two    Prior to Admission medications    Medication Sig Start Date End Date Taking? Authorizing Provider   letrozole (FEMARA) 2.5 MG tablet Take 1 tablet by mouth daily Patient states she only taking during her cycle.   Yes ProviderAly MD   busPIRone (BUSPAR) 7.5 MG tablet Take 1 tablet by mouth 3 times daily   Yes ProviderAly MD   Cholecalciferol (VITAMIN D3) 125 MCG (5000 UT) TABS Take by mouth   Yes ProviderAly MD   QUEtiapine (SEROQUEL) 100 MG tablet Take 1 tablet by mouth nightly 10/25/23 1/30/24 Yes Pau Watson APRN - CNP   Prenatal MV-Min-Fe Fum-FA-DHA (PRENATAL+DHA PO) Take by mouth   Yes ProviderAly MD   fexofenadine (ALLEGRA) 180 MG tablet Take 1 tablet by mouth daily as needed   Yes Automatic Reconciliation, Ar   Sod Fluoride-Potassium Nitrate 1.1-5 % PSTE ceived the following from [facility]: Outside name: sodium fluoride-pot nitrate 1.1-5 % pste 3/13/22  Yes Automatic Reconciliation, Ar         1/30/2024    10:32 AM 12/18/2023    10:03 AM 10/25/2023     1:19 PM   JOE-7 SCREENING   Feeling nervous, anxious, or on edge Nearly every day Nearly every day Nearly every day   Not being able to stop or control worrying More than half the days Nearly every day Nearly every day   Worrying too much about different things Several days Several days Nearly every day   Trouble relaxing More than half the days Several days Nearly every day   Being so restless that it is hard to sit still Not at all Several days Several days   Becoming easily annoyed or irritable Several days More than half the days Nearly every day   Feeling afraid as if

## 2024-01-30 NOTE — PROGRESS NOTES
CHIEF COMPLAINT:  Dariela Forde is a 33 y.o.  female and is domiciled with her  of 2 yrs (together 5 yrs) and their cats. Today she is being seen for a scheduled follow-up appointment to discuss symptom management associated with the historical diagnoses of MDD, recurrent, moderate, JOE, and ADHD with psychotropic medication management. *Last office appt- (12/18/23) Medication changes: Dc'd Atarax, No SSRI, encouraged lower dose Seroquel for anticipated pregnancy;  (10/25/23) Medication changes: Discontinue Vyvanse 30 mg - ineffective; discontinue Buspar 7.5 mg TID- SE- dizziness; discontinue Seroquel 50 mg- ineffective. Will start Hydroxyzine 10 mg TID, Serqouel 100 mg nightly. Because pt was late to appt will have to discuss ADHD meds at follow-up in 6 weeks.  9/25/23- No show; (7/31/23) -Vyvanse increased to 30 mg.       HPI:    (1/30/24) Today, Dariela arrived on time for her appointment. She was dressed appropriately for the weather and casual. Her speech was slightly pressured, with normal volume, and verbose. Thoughts were tangential and circumstantial.   Historically, Dariela reported the following mood symptoms- anxiety, depression, impaired concentration/focus.The symptoms have been present for years and range from moderate to severe. The symptoms occur constantly. Has a history, of ADHD,JOE, and Mood disorder. Pt self report of one manic episode in 2015 when she was on Wellbutrin- which included impulsivity- contacting ex-boyfriend, \"sleeping with him then stealing all kinds of random stuff from him\".  Associated symptoms include agitation, increased irritability, poor concentration, and stressed at work. Was referred by Dr. Messina (PCP) for depression and ADHD in 5/2023.   Currently, MOOD is described as \"okay\". She is upset that younger brother and wife are expecting- she and  are not. She had an appt with OB/GYN and was Rx Femara to enhance ability to become pregnant. All of

## 2024-02-01 RX ORDER — QUETIAPINE FUMARATE 25 MG/1
25 TABLET, FILM COATED ORAL NIGHTLY PRN
Qty: 30 TABLET | Refills: 1 | Status: SHIPPED | OUTPATIENT
Start: 2024-02-01 | End: 2024-04-01

## 2024-03-14 ENCOUNTER — TELEMEDICINE (OUTPATIENT)
Age: 34
End: 2024-03-14
Payer: COMMERCIAL

## 2024-03-14 DIAGNOSIS — F33.1 MAJOR DEPRESSIVE DISORDER, RECURRENT, MODERATE (HCC): Primary | ICD-10-CM

## 2024-03-14 DIAGNOSIS — F41.1 GAD (GENERALIZED ANXIETY DISORDER): ICD-10-CM

## 2024-03-14 PROCEDURE — 99213 OFFICE O/P EST LOW 20 MIN: CPT | Performed by: NURSE PRACTITIONER

## 2024-03-14 NOTE — PROGRESS NOTES
visit. Patient verbalized agreement and understanding of the above-stated plan.        No LOS data to display       3/14/2024  ALLEN Oh - CNP

## 2024-03-22 ENCOUNTER — OFFICE VISIT (OUTPATIENT)
Facility: CLINIC | Age: 34
End: 2024-03-22

## 2024-03-22 VITALS
OXYGEN SATURATION: 97 % | HEART RATE: 89 BPM | DIASTOLIC BLOOD PRESSURE: 77 MMHG | TEMPERATURE: 98.3 F | BODY MASS INDEX: 37.37 KG/M2 | HEIGHT: 67 IN | WEIGHT: 238.1 LBS | RESPIRATION RATE: 19 BRPM | SYSTOLIC BLOOD PRESSURE: 121 MMHG

## 2024-03-22 DIAGNOSIS — E55.9 VITAMIN D DEFICIENCY: ICD-10-CM

## 2024-03-22 DIAGNOSIS — F41.1 GENERALIZED ANXIETY DISORDER: ICD-10-CM

## 2024-03-22 DIAGNOSIS — F90.9 ATTENTION DEFICIT HYPERACTIVITY DISORDER (ADHD), UNSPECIFIED ADHD TYPE: ICD-10-CM

## 2024-03-22 DIAGNOSIS — Z00.00 WELL ADULT EXAM: Primary | ICD-10-CM

## 2024-03-22 DIAGNOSIS — E66.9 OBESITY (BMI 35.0-39.9 WITHOUT COMORBIDITY): ICD-10-CM

## 2024-03-22 DIAGNOSIS — Z11.59 ENCOUNTER FOR HEPATITIS C SCREENING TEST FOR LOW RISK PATIENT: ICD-10-CM

## 2024-03-22 SDOH — ECONOMIC STABILITY: HOUSING INSECURITY
IN THE LAST 12 MONTHS, WAS THERE A TIME WHEN YOU DID NOT HAVE A STEADY PLACE TO SLEEP OR SLEPT IN A SHELTER (INCLUDING NOW)?: NO

## 2024-03-22 SDOH — ECONOMIC STABILITY: FOOD INSECURITY: WITHIN THE PAST 12 MONTHS, THE FOOD YOU BOUGHT JUST DIDN'T LAST AND YOU DIDN'T HAVE MONEY TO GET MORE.: NEVER TRUE

## 2024-03-22 SDOH — ECONOMIC STABILITY: FOOD INSECURITY: WITHIN THE PAST 12 MONTHS, YOU WORRIED THAT YOUR FOOD WOULD RUN OUT BEFORE YOU GOT MONEY TO BUY MORE.: NEVER TRUE

## 2024-03-22 SDOH — ECONOMIC STABILITY: INCOME INSECURITY: HOW HARD IS IT FOR YOU TO PAY FOR THE VERY BASICS LIKE FOOD, HOUSING, MEDICAL CARE, AND HEATING?: NOT HARD AT ALL

## 2024-03-22 ASSESSMENT — ANXIETY QUESTIONNAIRES
5. BEING SO RESTLESS THAT IT IS HARD TO SIT STILL: NOT AT ALL
6. BECOMING EASILY ANNOYED OR IRRITABLE: MORE THAN HALF THE DAYS
4. TROUBLE RELAXING: MORE THAN HALF THE DAYS
1. FEELING NERVOUS, ANXIOUS, OR ON EDGE: NEARLY EVERY DAY
IF YOU CHECKED OFF ANY PROBLEMS ON THIS QUESTIONNAIRE, HOW DIFFICULT HAVE THESE PROBLEMS MADE IT FOR YOU TO DO YOUR WORK, TAKE CARE OF THINGS AT HOME, OR GET ALONG WITH OTHER PEOPLE: VERY DIFFICULT
2. NOT BEING ABLE TO STOP OR CONTROL WORRYING: MORE THAN HALF THE DAYS
GAD7 TOTAL SCORE: 13
3. WORRYING TOO MUCH ABOUT DIFFERENT THINGS: MORE THAN HALF THE DAYS
7. FEELING AFRAID AS IF SOMETHING AWFUL MIGHT HAPPEN: MORE THAN HALF THE DAYS

## 2024-03-22 ASSESSMENT — PATIENT HEALTH QUESTIONNAIRE - PHQ9
3. TROUBLE FALLING OR STAYING ASLEEP: NOT AT ALL
SUM OF ALL RESPONSES TO PHQ9 QUESTIONS 1 & 2: 2
6. FEELING BAD ABOUT YOURSELF - OR THAT YOU ARE A FAILURE OR HAVE LET YOURSELF OR YOUR FAMILY DOWN: NOT AT ALL
SUM OF ALL RESPONSES TO PHQ QUESTIONS 1-9: 2
SUM OF ALL RESPONSES TO PHQ QUESTIONS 1-9: 2
10. IF YOU CHECKED OFF ANY PROBLEMS, HOW DIFFICULT HAVE THESE PROBLEMS MADE IT FOR YOU TO DO YOUR WORK, TAKE CARE OF THINGS AT HOME, OR GET ALONG WITH OTHER PEOPLE: SOMEWHAT DIFFICULT
2. FEELING DOWN, DEPRESSED OR HOPELESS: MORE THAN HALF THE DAYS
4. FEELING TIRED OR HAVING LITTLE ENERGY: NOT AT ALL
SUM OF ALL RESPONSES TO PHQ QUESTIONS 1-9: 2
SUM OF ALL RESPONSES TO PHQ QUESTIONS 1-9: 2
5. POOR APPETITE OR OVEREATING: NOT AT ALL
8. MOVING OR SPEAKING SO SLOWLY THAT OTHER PEOPLE COULD HAVE NOTICED. OR THE OPPOSITE, BEING SO FIGETY OR RESTLESS THAT YOU HAVE BEEN MOVING AROUND A LOT MORE THAN USUAL: NOT AT ALL
7. TROUBLE CONCENTRATING ON THINGS, SUCH AS READING THE NEWSPAPER OR WATCHING TELEVISION: NOT AT ALL
9. THOUGHTS THAT YOU WOULD BE BETTER OFF DEAD, OR OF HURTING YOURSELF: NOT AT ALL

## 2024-03-22 NOTE — PROGRESS NOTES
Chief Complaint   Patient presents with    Annual Exam     \"Have you been to the ER, urgent care clinic since your last visit?  Hospitalized since your last visit?\"    NO    “Have you seen or consulted any other health care providers outside of [facility] since your last visit?”    NO            Click Here for Release of Records Request  HIPPA confirmed by two patient identifiers.

## 2024-03-22 NOTE — PROGRESS NOTES
Dariela Forde is a 34 y.o. female and presents with   Chief Complaint   Patient presents with    Annual Exam         .  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    No past medical history on file.  Past Surgical History:   Procedure Laterality Date    HX CHOLECYSTECTOMY      HX WISDOM TEETH EXTRACTION       Social History     Socioeconomic History    Marital status:    Tobacco Use    Smoking status: Former     Passive exposure: Past    Smokeless tobacco: Never   Vaping Use    Vaping Use: Never used   Substance and Sexual Activity    Alcohol use: Yes    Drug use: Never    Sexual activity: Yes     Partners: Male     Birth control/protection: I.U.D.     Social Determinants of Health     Financial Resource Strain: Low Risk     Difficulty of Paying Living Expenses: Not hard at all   Food Insecurity: No Food Insecurity    Worried About Running Out of Food in the Last Year: Never true    Ran Out of Food in the Last Year: Never true       Allergies   Allergen Reactions    Oxycodone Nausea And Vomiting and Nausea Only    Codeine Nausea Only        Current Outpatient Medications:     letrozole (FEMARA) 2.5 MG tablet, Take 1 tablet by mouth daily Patient states she only taking during her cycle., Disp: , Rfl:     busPIRone (BUSPAR) 7.5 MG tablet, Take 1 tablet by mouth 3 times daily, Disp: , Rfl:     Cholecalciferol (VITAMIN D3)

## 2024-04-25 ENCOUNTER — TELEMEDICINE (OUTPATIENT)
Age: 34
End: 2024-04-25
Payer: COMMERCIAL

## 2024-04-25 DIAGNOSIS — F41.1 GAD (GENERALIZED ANXIETY DISORDER): ICD-10-CM

## 2024-04-25 DIAGNOSIS — F33.1 MAJOR DEPRESSIVE DISORDER, RECURRENT, MODERATE (HCC): Primary | ICD-10-CM

## 2024-04-25 DIAGNOSIS — F90.2 ATTENTION DEFICIT HYPERACTIVITY DISORDER (ADHD), COMBINED TYPE: ICD-10-CM

## 2024-04-25 DIAGNOSIS — F39 UNSPECIFIED MOOD (AFFECTIVE) DISORDER (HCC): ICD-10-CM

## 2024-04-25 PROCEDURE — 99213 OFFICE O/P EST LOW 20 MIN: CPT | Performed by: NURSE PRACTITIONER

## 2024-04-25 NOTE — PROGRESS NOTES
Dariela Forde, was evaluated through a synchronous (real-time) audio-video encounter. The patient (or guardian if applicable) is aware that this is a billable service, which includes applicable co-pays. This Virtual Visit was conducted with patient's (and/or legal guardian's) consent. Patient identification was verified, and a caregiver was present when appropriate.   The patient was located at Home: [address] [place] [id number]  Provider was located at Home (Appt Dept [place]  Confirm you are appropriately licensed, registered, or certified to deliver care in the state where the patient is located as indicated above. If you are not or unsure, please re-schedule the visit: Yes, I confirm.     Are you appropriately licensed in the patient's state?: Yes    CHIEF COMPLAINT:  Dariela Forde is a 33 y.o.  female and is domiciled with her  of 2 yrs (together 5 yrs) and their cats. Today she is being seen for a scheduled follow-up appointment to discuss symptom management associated with the historical diagnoses of MDD, recurrent, moderate, JOE, and ADHD with psychotropic medication management. *Last office appt- (3/14/24) Medication changes: Pt not using Seroquel 25 mg QHS prn no refill needed- may consider use if needed. with consideration of dc if pregnant, Will continue Buspar 7.5 mg (refill not needed). (1/30/24)  Medication changes:Discontinue Seroquel 100 mg. Start Seroquel 25 mg QHS prn with consideration of dc with confirmed pregnancy.  (12/18/23) Medication changes: Dc'd Atarax, No SSRI, encouraged lower dose Seroquel for anticipated pregnancy;  (10/25/23) Medication changes: Discontinue Vyvanse 30 mg - ineffective; discontinue Buspar 7.5 mg TID- SE- dizziness; discontinue Seroquel 50 mg- ineffective. Will start Hydroxyzine 10 mg TID, Serqouel 100 mg nightly. Because pt was late to appt will have to discuss ADHD meds at follow-up in 6 weeks.  9/25/23- No show; (7/31/23) -Vyvanse

## 2024-05-02 LAB
25(OH)D3+25(OH)D2 SERPL-MCNC: 20.7 NG/ML (ref 30–100)
ALBUMIN SERPL-MCNC: 4.7 G/DL (ref 3.9–4.9)
ALBUMIN/GLOB SERPL: 1.9 {RATIO} (ref 1.2–2.2)
ALP SERPL-CCNC: 71 IU/L (ref 44–121)
ALT SERPL-CCNC: 21 IU/L (ref 0–32)
AST SERPL-CCNC: 16 IU/L (ref 0–40)
BASOPHILS # BLD AUTO: 0 X10E3/UL (ref 0–0.2)
BASOPHILS NFR BLD AUTO: 0 %
BILIRUB SERPL-MCNC: 0.8 MG/DL (ref 0–1.2)
BUN SERPL-MCNC: 12 MG/DL (ref 6–20)
BUN/CREAT SERPL: 15 (ref 9–23)
CALCIUM SERPL-MCNC: 9.8 MG/DL (ref 8.7–10.2)
CHLORIDE SERPL-SCNC: 100 MMOL/L (ref 96–106)
CHOLEST SERPL-MCNC: 197 MG/DL (ref 100–199)
CO2 SERPL-SCNC: 19 MMOL/L (ref 20–29)
CREAT SERPL-MCNC: 0.79 MG/DL (ref 0.57–1)
EGFRCR SERPLBLD CKD-EPI 2021: 100 ML/MIN/1.73
EOSINOPHIL # BLD AUTO: 0.1 X10E3/UL (ref 0–0.4)
EOSINOPHIL NFR BLD AUTO: 1 %
ERYTHROCYTE [DISTWIDTH] IN BLOOD BY AUTOMATED COUNT: 12.6 % (ref 11.7–15.4)
GLOBULIN SER CALC-MCNC: 2.5 G/DL (ref 1.5–4.5)
GLUCOSE SERPL-MCNC: 144 MG/DL (ref 70–99)
HBA1C MFR BLD: 5.9 % (ref 4.8–5.6)
HCT VFR BLD AUTO: 38.8 % (ref 34–46.6)
HCV AB SERPL QL IA: NORMAL
HCV IGG SERPL QL IA: NON REACTIVE
HDLC SERPL-MCNC: 60 MG/DL
HGB BLD-MCNC: 12.7 G/DL (ref 11.1–15.9)
IMM GRANULOCYTES # BLD AUTO: 0 X10E3/UL (ref 0–0.1)
IMM GRANULOCYTES NFR BLD AUTO: 0 %
IMP & REVIEW OF LAB RESULTS: NORMAL
LDLC SERPL CALC-MCNC: 116 MG/DL (ref 0–99)
LYMPHOCYTES # BLD AUTO: 2.3 X10E3/UL (ref 0.7–3.1)
LYMPHOCYTES NFR BLD AUTO: 33 %
MCH RBC QN AUTO: 28.9 PG (ref 26.6–33)
MCHC RBC AUTO-ENTMCNC: 32.7 G/DL (ref 31.5–35.7)
MCV RBC AUTO: 88 FL (ref 79–97)
MONOCYTES # BLD AUTO: 0.3 X10E3/UL (ref 0.1–0.9)
MONOCYTES NFR BLD AUTO: 4 %
NEUTROPHILS # BLD AUTO: 4.2 X10E3/UL (ref 1.4–7)
NEUTROPHILS NFR BLD AUTO: 62 %
PLATELET # BLD AUTO: 304 X10E3/UL (ref 150–450)
POTASSIUM SERPL-SCNC: 4.2 MMOL/L (ref 3.5–5.2)
PROT SERPL-MCNC: 7.2 G/DL (ref 6–8.5)
RBC # BLD AUTO: 4.39 X10E6/UL (ref 3.77–5.28)
SODIUM SERPL-SCNC: 139 MMOL/L (ref 134–144)
TRIGL SERPL-MCNC: 120 MG/DL (ref 0–149)
TSH SERPL DL<=0.005 MIU/L-ACNC: 3.64 UIU/ML (ref 0.45–4.5)
VLDLC SERPL CALC-MCNC: 21 MG/DL (ref 5–40)
WBC # BLD AUTO: 6.9 X10E3/UL (ref 3.4–10.8)

## 2024-05-03 PROBLEM — Z76.89 ESTABLISHING CARE WITH NEW DOCTOR, ENCOUNTER FOR: Status: RESOLVED | Noted: 2022-03-02 | Resolved: 2024-05-03

## 2024-05-03 PROBLEM — F90.0 ADHD (ATTENTION DEFICIT HYPERACTIVITY DISORDER), INATTENTIVE TYPE: Status: RESOLVED | Noted: 2022-03-02 | Resolved: 2024-05-03

## 2024-05-03 PROBLEM — R73.03 PREDIABETES: Status: ACTIVE | Noted: 2024-05-03

## 2024-06-06 ENCOUNTER — TELEMEDICINE (OUTPATIENT)
Age: 34
End: 2024-06-06
Payer: COMMERCIAL

## 2024-06-06 DIAGNOSIS — F41.1 GAD (GENERALIZED ANXIETY DISORDER): Primary | ICD-10-CM

## 2024-06-06 DIAGNOSIS — F33.1 MAJOR DEPRESSIVE DISORDER, RECURRENT, MODERATE (HCC): ICD-10-CM

## 2024-06-06 DIAGNOSIS — F90.2 ATTENTION DEFICIT HYPERACTIVITY DISORDER (ADHD), COMBINED TYPE: ICD-10-CM

## 2024-06-06 PROCEDURE — 99214 OFFICE O/P EST MOD 30 MIN: CPT | Performed by: NURSE PRACTITIONER

## 2024-06-06 RX ORDER — BUSPIRONE HYDROCHLORIDE 7.5 MG/1
7.5 TABLET ORAL 3 TIMES DAILY
Qty: 90 TABLET | Refills: 2 | Status: SHIPPED | OUTPATIENT
Start: 2024-06-06 | End: 2024-09-04

## 2024-06-06 ASSESSMENT — PATIENT HEALTH QUESTIONNAIRE - PHQ9
9. THOUGHTS THAT YOU WOULD BE BETTER OFF DEAD, OR OF HURTING YOURSELF: NOT AT ALL
7. TROUBLE CONCENTRATING ON THINGS, SUCH AS READING THE NEWSPAPER OR WATCHING TELEVISION: NOT AT ALL
10. IF YOU CHECKED OFF ANY PROBLEMS, HOW DIFFICULT HAVE THESE PROBLEMS MADE IT FOR YOU TO DO YOUR WORK, TAKE CARE OF THINGS AT HOME, OR GET ALONG WITH OTHER PEOPLE: SOMEWHAT DIFFICULT
SUM OF ALL RESPONSES TO PHQ QUESTIONS 1-9: 6
2. FEELING DOWN, DEPRESSED OR HOPELESS: SEVERAL DAYS
3. TROUBLE FALLING OR STAYING ASLEEP: NOT AT ALL
SUM OF ALL RESPONSES TO PHQ QUESTIONS 1-9: 6
1. LITTLE INTEREST OR PLEASURE IN DOING THINGS: SEVERAL DAYS
6. FEELING BAD ABOUT YOURSELF - OR THAT YOU ARE A FAILURE OR HAVE LET YOURSELF OR YOUR FAMILY DOWN: SEVERAL DAYS
4. FEELING TIRED OR HAVING LITTLE ENERGY: NOT AT ALL
5. POOR APPETITE OR OVEREATING: NEARLY EVERY DAY
8. MOVING OR SPEAKING SO SLOWLY THAT OTHER PEOPLE COULD HAVE NOTICED. OR THE OPPOSITE, BEING SO FIGETY OR RESTLESS THAT YOU HAVE BEEN MOVING AROUND A LOT MORE THAN USUAL: NOT AT ALL
SUM OF ALL RESPONSES TO PHQ QUESTIONS 1-9: 6
SUM OF ALL RESPONSES TO PHQ9 QUESTIONS 1 & 2: 2
SUM OF ALL RESPONSES TO PHQ QUESTIONS 1-9: 6

## 2024-06-06 ASSESSMENT — ANXIETY QUESTIONNAIRES
4. TROUBLE RELAXING: NOT AT ALL
IF YOU CHECKED OFF ANY PROBLEMS ON THIS QUESTIONNAIRE, HOW DIFFICULT HAVE THESE PROBLEMS MADE IT FOR YOU TO DO YOUR WORK, TAKE CARE OF THINGS AT HOME, OR GET ALONG WITH OTHER PEOPLE: NOT DIFFICULT AT ALL
2. NOT BEING ABLE TO STOP OR CONTROL WORRYING: NOT AT ALL
1. FEELING NERVOUS, ANXIOUS, OR ON EDGE: SEVERAL DAYS
7. FEELING AFRAID AS IF SOMETHING AWFUL MIGHT HAPPEN: SEVERAL DAYS
GAD7 TOTAL SCORE: 3
3. WORRYING TOO MUCH ABOUT DIFFERENT THINGS: SEVERAL DAYS
6. BECOMING EASILY ANNOYED OR IRRITABLE: NOT AT ALL
5. BEING SO RESTLESS THAT IT IS HARD TO SIT STILL: NOT AT ALL

## 2024-06-06 NOTE — PROGRESS NOTES
Dariela Forde, was evaluated through a synchronous (real-time) audio-video encounter. The patient (or guardian if applicable) is aware that this is a billable service, which includes applicable co-pays. This Virtual Visit was conducted with patient's (and/or legal guardian's) consent. Patient identification was verified, and a caregiver was present when appropriate.   The patient was located at Home: [address] [place] [id number]  Provider was located at Home [place]Appt [place]  Confirm you are appropriately licensed, registered, or certified to deliver care in the state where the patient is located as indicated above. If you are not or unsure, please re-schedule the visit: Yes, I confirm.     Are you appropriately licensed in the patient's state?: Yes    CHIEF COMPLAINT:  Dariela Forde is a 34 y.o.  female and is domiciled with her  (Canelo) of 2 yrs (together 5 yrs) and their cats. Today she is being seen for a scheduled follow-up appointment to discuss symptom management associated with the historical diagnoses of MDD, recurrent, moderate, JOE, and ADHD with psychotropic medication management. *Last office appt- (4/25/24) No med changes; (3/14/24) Medication changes: Pt not using Seroquel 25 mg QHS prn no refill needed- may consider use if needed. with consideration of dc if pregnant, Will continue Buspar 7.5 mg (refill not needed). (1/30/24)  Medication changes:Discontinue Seroquel 100 mg. Start Seroquel 25 mg QHS prn with consideration of dc with confirmed pregnancy.  (12/18/23) Medication changes: Dc'd Atarax, No SSRI, encouraged lower dose Seroquel for anticipated pregnancy;  (10/25/23) Medication changes: Discontinue Vyvanse 30 mg - ineffective; discontinue Buspar 7.5 mg TID- SE- dizziness; discontinue Seroquel 50 mg- ineffective. Will start Hydroxyzine 10 mg TID, Serqouel 100 mg nightly. Because pt was late to appt will have to discuss ADHD meds at follow-up in 6 weeks.

## 2024-06-07 ENCOUNTER — TELEPHONE (OUTPATIENT)
Facility: CLINIC | Age: 34
End: 2024-06-07

## 2024-06-07 NOTE — TELEPHONE ENCOUNTER
Patient wants to know if she can get an inhaler, she states that she had asthma as a child, and feel like it's coming back

## 2024-10-31 ENCOUNTER — OFFICE VISIT (OUTPATIENT)
Age: 34
End: 2024-10-31
Payer: COMMERCIAL

## 2024-10-31 VITALS
RESPIRATION RATE: 16 BRPM | OXYGEN SATURATION: 99 % | WEIGHT: 233 LBS | HEART RATE: 93 BPM | TEMPERATURE: 98.1 F | DIASTOLIC BLOOD PRESSURE: 81 MMHG | BODY MASS INDEX: 36.57 KG/M2 | SYSTOLIC BLOOD PRESSURE: 118 MMHG | HEIGHT: 67 IN

## 2024-10-31 DIAGNOSIS — F90.2 ATTENTION DEFICIT HYPERACTIVITY DISORDER (ADHD), COMBINED TYPE: Primary | ICD-10-CM

## 2024-10-31 PROCEDURE — 90792 PSYCH DIAG EVAL W/MED SRVCS: CPT | Performed by: NURSE PRACTITIONER

## 2024-10-31 RX ORDER — BUSPIRONE HYDROCHLORIDE 5 MG/1
7.5 TABLET ORAL 3 TIMES DAILY
COMMUNITY

## 2024-10-31 RX ORDER — DEXTROAMPHETAMINE SACCHARATE, AMPHETAMINE ASPARTATE MONOHYDRATE, DEXTROAMPHETAMINE SULFATE AND AMPHETAMINE SULFATE 3.75; 3.75; 3.75; 3.75 MG/1; MG/1; MG/1; MG/1
15 CAPSULE, EXTENDED RELEASE ORAL DAILY
Qty: 30 CAPSULE | Refills: 0 | Status: SHIPPED | OUTPATIENT
Start: 2024-10-31 | End: 2024-11-30

## 2024-10-31 ASSESSMENT — COLUMBIA-SUICIDE SEVERITY RATING SCALE - C-SSRS
1. WITHIN THE PAST MONTH, HAVE YOU WISHED YOU WERE DEAD OR WISHED YOU COULD GO TO SLEEP AND NOT WAKE UP?: NO
6. HAVE YOU EVER DONE ANYTHING, STARTED TO DO ANYTHING, OR PREPARED TO DO ANYTHING TO END YOUR LIFE?: NO
2. HAVE YOU ACTUALLY HAD ANY THOUGHTS OF KILLING YOURSELF?: NO

## 2024-10-31 ASSESSMENT — PATIENT HEALTH QUESTIONNAIRE - PHQ9
10. IF YOU CHECKED OFF ANY PROBLEMS, HOW DIFFICULT HAVE THESE PROBLEMS MADE IT FOR YOU TO DO YOUR WORK, TAKE CARE OF THINGS AT HOME, OR GET ALONG WITH OTHER PEOPLE: VERY DIFFICULT
SUM OF ALL RESPONSES TO PHQ QUESTIONS 1-9: 15
2. FEELING DOWN, DEPRESSED OR HOPELESS: MORE THAN HALF THE DAYS
5. POOR APPETITE OR OVEREATING: SEVERAL DAYS
SUM OF ALL RESPONSES TO PHQ QUESTIONS 1-9: 15
9. THOUGHTS THAT YOU WOULD BE BETTER OFF DEAD, OR OF HURTING YOURSELF: SEVERAL DAYS
1. LITTLE INTEREST OR PLEASURE IN DOING THINGS: SEVERAL DAYS
SUM OF ALL RESPONSES TO PHQ9 QUESTIONS 1 & 2: 3
SUM OF ALL RESPONSES TO PHQ QUESTIONS 1-9: 15
3. TROUBLE FALLING OR STAYING ASLEEP: NEARLY EVERY DAY
SUM OF ALL RESPONSES TO PHQ QUESTIONS 1-9: 14
7. TROUBLE CONCENTRATING ON THINGS, SUCH AS READING THE NEWSPAPER OR WATCHING TELEVISION: MORE THAN HALF THE DAYS
4. FEELING TIRED OR HAVING LITTLE ENERGY: MORE THAN HALF THE DAYS
8. MOVING OR SPEAKING SO SLOWLY THAT OTHER PEOPLE COULD HAVE NOTICED. OR THE OPPOSITE, BEING SO FIGETY OR RESTLESS THAT YOU HAVE BEEN MOVING AROUND A LOT MORE THAN USUAL: NOT AT ALL
6. FEELING BAD ABOUT YOURSELF - OR THAT YOU ARE A FAILURE OR HAVE LET YOURSELF OR YOUR FAMILY DOWN: NEARLY EVERY DAY

## 2024-10-31 ASSESSMENT — ANXIETY QUESTIONNAIRES
6. BECOMING EASILY ANNOYED OR IRRITABLE: SEVERAL DAYS
7. FEELING AFRAID AS IF SOMETHING AWFUL MIGHT HAPPEN: NEARLY EVERY DAY
4. TROUBLE RELAXING: SEVERAL DAYS
IF YOU CHECKED OFF ANY PROBLEMS ON THIS QUESTIONNAIRE, HOW DIFFICULT HAVE THESE PROBLEMS MADE IT FOR YOU TO DO YOUR WORK, TAKE CARE OF THINGS AT HOME, OR GET ALONG WITH OTHER PEOPLE: VERY DIFFICULT
5. BEING SO RESTLESS THAT IT IS HARD TO SIT STILL: NOT AT ALL
GAD7 TOTAL SCORE: 12
2. NOT BEING ABLE TO STOP OR CONTROL WORRYING: NEARLY EVERY DAY
3. WORRYING TOO MUCH ABOUT DIFFERENT THINGS: SEVERAL DAYS
1. FEELING NERVOUS, ANXIOUS, OR ON EDGE: NEARLY EVERY DAY

## 2024-10-31 NOTE — PROGRESS NOTES
Chief Complaint   Patient presents with    New Patient     Former Pau Watson pt      Vitals:    10/31/24 0854   BP: 118/81   Pulse: 93   Resp: 16   Temp: 98.1 °F (36.7 °C)   SpO2: 99%      Prior to Admission medications    Medication Sig Start Date End Date Taking? Authorizing Provider   busPIRone (BUSPAR) 5 MG tablet Take 1.5 tablets by mouth 3 times daily   Yes Pau Watson, APRN - CNP   Cholecalciferol (VITAMIN D3) 125 MCG (5000 UT) TABS Take by mouth   Yes ProviderAly MD   Prenatal MV-Min-Fe Fum-FA-DHA (PRENATAL+DHA PO) Take by mouth   Yes ProviderAly MD   fexofenadine (ALLEGRA) 180 MG tablet Take 1 tablet by mouth daily as needed   Yes Automatic Reconciliation, Ar   Sod Fluoride-Potassium Nitrate 1.1-5 % PSTE ceived the following from [facility]: Outside name: sodium fluoride-pot nitrate 1.1-5 % pste 3/13/22  Yes Automatic Reconciliation, Ar   letrozole (FEMARA) 2.5 MG tablet Take 1 tablet by mouth daily Patient states she only taking during her cycle.    Provider, MD Aly      PHQ-9 Total Score: 15 (10/31/2024  8:57 AM)  Thoughts that you would be better off dead, or of hurting yourself in some way: 1 (10/31/2024  8:57 AM)         10/31/2024     8:57 AM 6/6/2024     3:14 PM 3/22/2024     8:57 AM   PHQ-9    Little interest or pleasure in doing things 1 1 0   Feeling down, depressed, or hopeless 2 1 2   Trouble falling or staying asleep, or sleeping too much 3 0 0   Feeling tired or having little energy 2 0 0   Poor appetite or overeating 1 3 0   Feeling bad about yourself - or that you are a failure or have let yourself or your family down 3 1 0   Trouble concentrating on things, such as reading the newspaper or watching television 2 0 0   Moving or speaking so slowly that other people could have noticed. Or the opposite - being so fidgety or restless that you have been moving around a lot more than usual 0 0 0   Thoughts that you would be better off dead, or of hurting

## 2024-10-31 NOTE — PROGRESS NOTES
Chief Complaint   Patient presents with    New Patient     Former Pau Watson pt       History of Present Ilness:     Dariela Forde is a 34 y.o.  year old female with a reported history of depression, anxiety, and ADHD who presents today to establish care for medication management. Patient is former patient of Pau Watson NP who has since left the practice. Patient reports that her symptoms have been present for years and currently are of moderate severity. Patient identifies work related stress. 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 Patient is currently prescribed Buspar 7.5 mg TID. She reports having some difficulty with compliance with the mid-day dose. Patient states that she has also had some issues with nausea.  She explains that she is only taking this because she has had negative effects to multiple antidepressants in the past. Today, her concern resuming treatment for her ADHD. She explained that her previous provided had stopped the medication due to her and her  wanting to get pregnant. They have not been successful over the last 2 years and she feels that work has become too overwhelming for them all and would like to get back on medications. She also feels that maybe the stress is contributing to their inability to get pregnant.    Her depression generally is characterized as low mood, little interest or pleasure in doing things, difficulty getting to sleep, difficulty staying asleep, feeling tired or having little energy, becomes withdrawn from others, thoughts of death. Patient denies suicide, no intent or plan. She has indicated that she would go to the nearest ER or call 911 or [phone] should her symptoms worsen.     Anxiety generally presents as feeling overwhelmed, poor concentration, restlessness, irritability, and ruminating.    Inattention manifests

## 2024-11-22 ENCOUNTER — TELEPHONE (OUTPATIENT)
Age: 34
End: 2024-11-22

## 2024-11-29 ASSESSMENT — PATIENT HEALTH QUESTIONNAIRE - PHQ9
SUM OF ALL RESPONSES TO PHQ QUESTIONS 1-9: 11
5. POOR APPETITE OR OVEREATING: NOT AT ALL
4. FEELING TIRED OR HAVING LITTLE ENERGY: SEVERAL DAYS
2. FEELING DOWN, DEPRESSED OR HOPELESS: MORE THAN HALF THE DAYS
SUM OF ALL RESPONSES TO PHQ QUESTIONS 1-9: 11
3. TROUBLE FALLING OR STAYING ASLEEP: MORE THAN HALF THE DAYS
6. FEELING BAD ABOUT YOURSELF - OR THAT YOU ARE A FAILURE OR HAVE LET YOURSELF OR YOUR FAMILY DOWN: MORE THAN HALF THE DAYS
7. TROUBLE CONCENTRATING ON THINGS, SUCH AS READING THE NEWSPAPER OR WATCHING TELEVISION: NEARLY EVERY DAY
8. MOVING OR SPEAKING SO SLOWLY THAT OTHER PEOPLE COULD HAVE NOTICED. OR THE OPPOSITE, BEING SO FIGETY OR RESTLESS THAT YOU HAVE BEEN MOVING AROUND A LOT MORE THAN USUAL: NOT AT ALL
1. LITTLE INTEREST OR PLEASURE IN DOING THINGS: SEVERAL DAYS
3. TROUBLE FALLING OR STAYING ASLEEP: MORE THAN HALF THE DAYS
7. TROUBLE CONCENTRATING ON THINGS, SUCH AS READING THE NEWSPAPER OR WATCHING TELEVISION: NEARLY EVERY DAY
SUM OF ALL RESPONSES TO PHQ QUESTIONS 1-9: 11
9. THOUGHTS THAT YOU WOULD BE BETTER OFF DEAD, OR OF HURTING YOURSELF: NOT AT ALL
SUM OF ALL RESPONSES TO PHQ9 QUESTIONS 1 & 2: 3
SUM OF ALL RESPONSES TO PHQ QUESTIONS 1-9: 11
1. LITTLE INTEREST OR PLEASURE IN DOING THINGS: SEVERAL DAYS
9. THOUGHTS THAT YOU WOULD BE BETTER OFF DEAD, OR OF HURTING YOURSELF: NOT AT ALL
8. MOVING OR SPEAKING SO SLOWLY THAT OTHER PEOPLE COULD HAVE NOTICED. OR THE OPPOSITE - BEING SO FIDGETY OR RESTLESS THAT YOU HAVE BEEN MOVING AROUND A LOT MORE THAN USUAL: NOT AT ALL
10. IF YOU CHECKED OFF ANY PROBLEMS, HOW DIFFICULT HAVE THESE PROBLEMS MADE IT FOR YOU TO DO YOUR WORK, TAKE CARE OF THINGS AT HOME, OR GET ALONG WITH OTHER PEOPLE: VERY DIFFICULT
4. FEELING TIRED OR HAVING LITTLE ENERGY: SEVERAL DAYS
10. IF YOU CHECKED OFF ANY PROBLEMS, HOW DIFFICULT HAVE THESE PROBLEMS MADE IT FOR YOU TO DO YOUR WORK, TAKE CARE OF THINGS AT HOME, OR GET ALONG WITH OTHER PEOPLE: VERY DIFFICULT
SUM OF ALL RESPONSES TO PHQ QUESTIONS 1-9: 11
2. FEELING DOWN, DEPRESSED OR HOPELESS: MORE THAN HALF THE DAYS
5. POOR APPETITE OR OVEREATING: NOT AT ALL
6. FEELING BAD ABOUT YOURSELF - OR THAT YOU ARE A FAILURE OR HAVE LET YOURSELF OR YOUR FAMILY DOWN: MORE THAN HALF THE DAYS

## 2024-11-29 ASSESSMENT — ANXIETY QUESTIONNAIRES
2. NOT BEING ABLE TO STOP OR CONTROL WORRYING: MORE THAN HALF THE DAYS
IF YOU CHECKED OFF ANY PROBLEMS ON THIS QUESTIONNAIRE, HOW DIFFICULT HAVE THESE PROBLEMS MADE IT FOR YOU TO DO YOUR WORK, TAKE CARE OF THINGS AT HOME, OR GET ALONG WITH OTHER PEOPLE: VERY DIFFICULT
IF YOU CHECKED OFF ANY PROBLEMS ON THIS QUESTIONNAIRE, HOW DIFFICULT HAVE THESE PROBLEMS MADE IT FOR YOU TO DO YOUR WORK, TAKE CARE OF THINGS AT HOME, OR GET ALONG WITH OTHER PEOPLE: VERY DIFFICULT
4. TROUBLE RELAXING: SEVERAL DAYS
2. NOT BEING ABLE TO STOP OR CONTROL WORRYING: MORE THAN HALF THE DAYS
7. FEELING AFRAID AS IF SOMETHING AWFUL MIGHT HAPPEN: MORE THAN HALF THE DAYS
5. BEING SO RESTLESS THAT IT IS HARD TO SIT STILL: MORE THAN HALF THE DAYS
3. WORRYING TOO MUCH ABOUT DIFFERENT THINGS: SEVERAL DAYS
6. BECOMING EASILY ANNOYED OR IRRITABLE: NOT AT ALL
1. FEELING NERVOUS, ANXIOUS, OR ON EDGE: MORE THAN HALF THE DAYS
GAD7 TOTAL SCORE: 10
6. BECOMING EASILY ANNOYED OR IRRITABLE: NOT AT ALL
5. BEING SO RESTLESS THAT IT IS HARD TO SIT STILL: MORE THAN HALF THE DAYS
3. WORRYING TOO MUCH ABOUT DIFFERENT THINGS: SEVERAL DAYS
7. FEELING AFRAID AS IF SOMETHING AWFUL MIGHT HAPPEN: MORE THAN HALF THE DAYS
1. FEELING NERVOUS, ANXIOUS, OR ON EDGE: MORE THAN HALF THE DAYS
4. TROUBLE RELAXING: SEVERAL DAYS

## 2024-12-02 ENCOUNTER — TELEMEDICINE (OUTPATIENT)
Age: 34
End: 2024-12-02
Payer: COMMERCIAL

## 2024-12-02 DIAGNOSIS — F90.2 ATTENTION DEFICIT HYPERACTIVITY DISORDER (ADHD), COMBINED TYPE: ICD-10-CM

## 2024-12-02 DIAGNOSIS — F41.1 GAD (GENERALIZED ANXIETY DISORDER): Primary | ICD-10-CM

## 2024-12-02 PROCEDURE — 99214 OFFICE O/P EST MOD 30 MIN: CPT | Performed by: NURSE PRACTITIONER

## 2024-12-02 RX ORDER — DEXTROAMPHETAMINE SACCHARATE, AMPHETAMINE ASPARTATE MONOHYDRATE, DEXTROAMPHETAMINE SULFATE AND AMPHETAMINE SULFATE 3.75; 3.75; 3.75; 3.75 MG/1; MG/1; MG/1; MG/1
15 CAPSULE, EXTENDED RELEASE ORAL DAILY
Qty: 30 CAPSULE | Refills: 0 | Status: SHIPPED | OUTPATIENT
Start: 2024-12-02 | End: 2025-01-01

## 2024-12-02 RX ORDER — PROPRANOLOL HYDROCHLORIDE 10 MG/1
10 TABLET ORAL 3 TIMES DAILY PRN
Qty: 90 TABLET | Refills: 1 | Status: SHIPPED | OUTPATIENT
Start: 2024-12-02

## 2024-12-02 NOTE — PROGRESS NOTES
Chief Complaint   Patient presents with    Medication Check     Prior to Admission medications    Medication Sig Start Date End Date Taking? Authorizing Provider   busPIRone (BUSPAR) 5 MG tablet Take 1.5 tablets by mouth 3 times daily   Yes Pau Watson APRN - CNP   amphetamine-dextroamphetamine (ADDERALL XR) 15 MG extended release capsule Take 1 capsule by mouth daily for 30 days. Max Daily Amount: 15 mg 10/31/24 12/2/24 Yes Pam Javier APRN - NP   Cholecalciferol (VITAMIN D3) 125 MCG (5000 UT) TABS Take by mouth   Yes Aly Montaño MD   Prenatal MV-Min-Fe Fum-FA-DHA (PRENATAL+DHA PO) Take by mouth   Yes Aly Montaño MD   fexofenadine (ALLEGRA) 180 MG tablet Take 1 tablet by mouth daily as needed   Yes Automatic Reconciliation, Ar   Sod Fluoride-Potassium Nitrate 1.1-5 % PSTE ceived the following from [facility]: Outside name: sodium fluoride-pot nitrate 1.1-5 % pste 3/13/22  Yes Automatic Reconciliation, Ar   letrozole (FEMARA) 2.5 MG tablet Take 1 tablet by mouth daily Patient states she only taking during her cycle.    ProviderAly MD         11/29/2024    10:48 AM   Patient-Reported Vitals   Patient-Reported Weight 232   Patient-Reported Height 5’8”   Patient-Reported Pulse 74      PHQ-9 Total Score: 11 (11/29/2024 10:50 AM)  Thoughts that you would be better off dead, or of hurting yourself in some way: 0 (11/29/2024 10:50 AM)         11/29/2024    10:50 AM 10/31/2024     8:57 AM 6/6/2024     3:14 PM   PHQ-9    Little interest or pleasure in doing things 1 1 1   Feeling down, depressed, or hopeless 2 2 1   Trouble falling or staying asleep, or sleeping too much 2 3 0   Feeling tired or having little energy 1 2 0   Poor appetite or overeating 0 1 3   Feeling bad about yourself - or that you are a failure or have let yourself or your family down 2 3 1   Trouble concentrating on things, such as reading the newspaper or watching television 3 2 0   Moving or 
daily      Cholecalciferol (VITAMIN D3) 125 MCG (5000 UT) TABS Take by mouth      Prenatal MV-Min-Fe Fum-FA-DHA (PRENATAL+DHA PO) Take by mouth      fexofenadine (ALLEGRA) 180 MG tablet Take 1 tablet by mouth daily as needed      Sod Fluoride-Potassium Nitrate 1.1-5 % PSTE ceived the following from [facility]: Outside name: sodium fluoride-pot nitrate 1.1-5 % pste      letrozole (FEMARA) 2.5 MG tablet Take 1 tablet by mouth daily Patient states she only taking during her cycle.       No current facility-administered medications on file prior to visit.        Past Medical History:   Diagnosis Date    ADHD (attention deficit hyperactivity disorder)         Family History   Problem Relation Age of Onset    Osteoarthritis Maternal Aunt         Lupus    Diabetes Paternal Grandfather     Gall Bladder Disease Paternal Grandfather     Gall Bladder Disease Paternal Uncle     Psychiatric Disorder Maternal Aunt     Osteoarthritis Mother     Asthma Mother         Inhaler    Hypertension Mother     Psychiatric Disorder Mother     Cancer Father         Cholangiocarcinoma    Gall Bladder Disease Father     Hypertension Father     Osteoarthritis Maternal Grandmother         RA    Cancer Maternal Grandmother         Uterine    Psychiatric Disorder Maternal Grandmother     Cancer Paternal Grandfather         Prostate    Psychiatric Disorder Paternal Grandfather        REVIEW OF SYSTEMS:  Psychiatric symptoms being monitored for:  depression, anxiety, inattentiveness  Constitutional: No report of fever, or weight gain.  Eyes: No report of acute vision changes.   ENT: No report of hearing changes or difficulty swallowing.   Cardiac: No report of chest pain, edema or orthopnea.   Respiratory: Denies dyspnea, cough or wheeze.   GI: No report of abdominal pain.   : No report of dysuria or hematuria.   Musculoskeletal: No report of joint pain or swelling.   Skin: No report of rash, lesion, or abrasions.   Neurologic: No

## 2025-01-03 ENCOUNTER — TELEMEDICINE (OUTPATIENT)
Age: 35
End: 2025-01-03
Payer: COMMERCIAL

## 2025-01-03 DIAGNOSIS — F90.2 ATTENTION DEFICIT HYPERACTIVITY DISORDER (ADHD), COMBINED TYPE: ICD-10-CM

## 2025-01-03 PROCEDURE — 99214 OFFICE O/P EST MOD 30 MIN: CPT | Performed by: NURSE PRACTITIONER

## 2025-01-03 RX ORDER — DEXTROAMPHETAMINE SACCHARATE, AMPHETAMINE ASPARTATE MONOHYDRATE, DEXTROAMPHETAMINE SULFATE AND AMPHETAMINE SULFATE 3.75; 3.75; 3.75; 3.75 MG/1; MG/1; MG/1; MG/1
15 CAPSULE, EXTENDED RELEASE ORAL DAILY
Qty: 30 CAPSULE | Refills: 0 | Status: SHIPPED | OUTPATIENT
Start: 2025-01-03 | End: 2025-02-02

## 2025-01-03 ASSESSMENT — PATIENT HEALTH QUESTIONNAIRE - PHQ9
3. TROUBLE FALLING OR STAYING ASLEEP: MORE THAN HALF THE DAYS
5. POOR APPETITE OR OVEREATING: MORE THAN HALF THE DAYS
2. FEELING DOWN, DEPRESSED OR HOPELESS: MORE THAN HALF THE DAYS
2. FEELING DOWN, DEPRESSED OR HOPELESS: MORE THAN HALF THE DAYS
10. IF YOU CHECKED OFF ANY PROBLEMS, HOW DIFFICULT HAVE THESE PROBLEMS MADE IT FOR YOU TO DO YOUR WORK, TAKE CARE OF THINGS AT HOME, OR GET ALONG WITH OTHER PEOPLE: VERY DIFFICULT
8. MOVING OR SPEAKING SO SLOWLY THAT OTHER PEOPLE COULD HAVE NOTICED. OR THE OPPOSITE, BEING SO FIGETY OR RESTLESS THAT YOU HAVE BEEN MOVING AROUND A LOT MORE THAN USUAL: NOT AT ALL
3. TROUBLE FALLING OR STAYING ASLEEP: MORE THAN HALF THE DAYS
9. THOUGHTS THAT YOU WOULD BE BETTER OFF DEAD, OR OF HURTING YOURSELF: NOT AT ALL
7. TROUBLE CONCENTRATING ON THINGS, SUCH AS READING THE NEWSPAPER OR WATCHING TELEVISION: SEVERAL DAYS
8. MOVING OR SPEAKING SO SLOWLY THAT OTHER PEOPLE COULD HAVE NOTICED. OR THE OPPOSITE - BEING SO FIDGETY OR RESTLESS THAT YOU HAVE BEEN MOVING AROUND A LOT MORE THAN USUAL: NOT AT ALL
10. IF YOU CHECKED OFF ANY PROBLEMS, HOW DIFFICULT HAVE THESE PROBLEMS MADE IT FOR YOU TO DO YOUR WORK, TAKE CARE OF THINGS AT HOME, OR GET ALONG WITH OTHER PEOPLE: VERY DIFFICULT
SUM OF ALL RESPONSES TO PHQ QUESTIONS 1-9: 11
9. THOUGHTS THAT YOU WOULD BE BETTER OFF DEAD, OR OF HURTING YOURSELF: NOT AT ALL
1. LITTLE INTEREST OR PLEASURE IN DOING THINGS: SEVERAL DAYS
1. LITTLE INTEREST OR PLEASURE IN DOING THINGS: SEVERAL DAYS
SUM OF ALL RESPONSES TO PHQ QUESTIONS 1-9: 11
6. FEELING BAD ABOUT YOURSELF - OR THAT YOU ARE A FAILURE OR HAVE LET YOURSELF OR YOUR FAMILY DOWN: MORE THAN HALF THE DAYS
SUM OF ALL RESPONSES TO PHQ QUESTIONS 1-9: 11
4. FEELING TIRED OR HAVING LITTLE ENERGY: SEVERAL DAYS
4. FEELING TIRED OR HAVING LITTLE ENERGY: SEVERAL DAYS
6. FEELING BAD ABOUT YOURSELF - OR THAT YOU ARE A FAILURE OR HAVE LET YOURSELF OR YOUR FAMILY DOWN: MORE THAN HALF THE DAYS
7. TROUBLE CONCENTRATING ON THINGS, SUCH AS READING THE NEWSPAPER OR WATCHING TELEVISION: SEVERAL DAYS
5. POOR APPETITE OR OVEREATING: MORE THAN HALF THE DAYS
SUM OF ALL RESPONSES TO PHQ9 QUESTIONS 1 & 2: 3
SUM OF ALL RESPONSES TO PHQ QUESTIONS 1-9: 11
SUM OF ALL RESPONSES TO PHQ QUESTIONS 1-9: 11

## 2025-01-03 ASSESSMENT — ANXIETY QUESTIONNAIRES
1. FEELING NERVOUS, ANXIOUS, OR ON EDGE: MORE THAN HALF THE DAYS
2. NOT BEING ABLE TO STOP OR CONTROL WORRYING: SEVERAL DAYS
3. WORRYING TOO MUCH ABOUT DIFFERENT THINGS: SEVERAL DAYS
5. BEING SO RESTLESS THAT IT IS HARD TO SIT STILL: NOT AT ALL
6. BECOMING EASILY ANNOYED OR IRRITABLE: SEVERAL DAYS
IF YOU CHECKED OFF ANY PROBLEMS ON THIS QUESTIONNAIRE, HOW DIFFICULT HAVE THESE PROBLEMS MADE IT FOR YOU TO DO YOUR WORK, TAKE CARE OF THINGS AT HOME, OR GET ALONG WITH OTHER PEOPLE: VERY DIFFICULT
5. BEING SO RESTLESS THAT IT IS HARD TO SIT STILL: NOT AT ALL
4. TROUBLE RELAXING: SEVERAL DAYS
1. FEELING NERVOUS, ANXIOUS, OR ON EDGE: MORE THAN HALF THE DAYS
IF YOU CHECKED OFF ANY PROBLEMS ON THIS QUESTIONNAIRE, HOW DIFFICULT HAVE THESE PROBLEMS MADE IT FOR YOU TO DO YOUR WORK, TAKE CARE OF THINGS AT HOME, OR GET ALONG WITH OTHER PEOPLE: VERY DIFFICULT
7. FEELING AFRAID AS IF SOMETHING AWFUL MIGHT HAPPEN: MORE THAN HALF THE DAYS
GAD7 TOTAL SCORE: 8
6. BECOMING EASILY ANNOYED OR IRRITABLE: SEVERAL DAYS
2. NOT BEING ABLE TO STOP OR CONTROL WORRYING: SEVERAL DAYS
3. WORRYING TOO MUCH ABOUT DIFFERENT THINGS: SEVERAL DAYS
7. FEELING AFRAID AS IF SOMETHING AWFUL MIGHT HAPPEN: MORE THAN HALF THE DAYS
4. TROUBLE RELAXING: SEVERAL DAYS

## 2025-01-03 NOTE — PROGRESS NOTES
Dariela Forde, was evaluated through a synchronous (real-time) audio-video encounter. The patient (or guardian if applicable) is aware that this is a billable service, which includes applicable co-pays. This Virtual Visit was conducted with patient's (and/or legal guardian's) consent. Patient identification was verified, and a caregiver was present when appropriate.     The patient was located at Home: [address] [facility] [address]  Provider was located at Facility (Appt Dept): [address] [facility] [address]  [address]  [place] [address]  Confirm you are appropriately licensed, registered, or certified to deliver care in the state where the patient is located as indicated above. If you are not or unsure, please re-schedule the visit: Yes, I confirm.       CHIEF COMPLAINT:  Dariela Forde is a 34 y.o. female and was seen today for follow-up of psychiatric condition and psychotropic medication management.     HPI:    Dariela reports the following psychiatric symptoms by hx:  depression, anxiety, inattentiveness.  Overall symptoms have been present for years. Currently symptoms are of moderate severity. The symptoms occur intermittently. Patient denies any new stressor. She feels her depression and anxiety are somewhat improved. Energy level fluctuates. Concentrate and ability to stay on task is somewhat better on some days. Patient admits that she does not take the Adderall on daily basis due to her staying up late and sleeping in on some days. She has friend who live in another time zone who she tries to do things with. She reports the propranolol has been helpful particularly for the physical symptoms of anxiety. She has not experienced any panic attacks. No complaint of chest pain, dizziness, palpitations, headaches. Patient Appetite: \"fine,\" she reports eating a little more than usual with the holidays. Sleep: \"funny.\" She states that on some days she may not go to sleep until 2-4 am and then wake up

## 2025-01-03 NOTE — PROGRESS NOTES
Chief Complaint   Patient presents with    Medication Check     Prior to Admission medications    Medication Sig Start Date End Date Taking? Authorizing Provider   propranolol (INDERAL) 10 MG tablet Take 1 tablet by mouth 3 times daily as needed (for anxiety) 12/2/24  Yes Pam Javier APRN - NP   amphetamine-dextroamphetamine (ADDERALL XR) 15 MG extended release capsule Take 1 capsule by mouth daily for 30 days. Max Daily Amount: 15 mg 12/2/24 1/3/25 Yes Pam Javier APRN - NP   Cholecalciferol (VITAMIN D3) 125 MCG (5000 UT) TABS Take by mouth   Yes Aly Montaño MD   Prenatal MV-Min-Fe Fum-FA-DHA (PRENATAL+DHA PO) Take by mouth   Yes Aly Montaño MD   fexofenadine (ALLEGRA) 180 MG tablet Take 1 tablet by mouth daily as needed   Yes Automatic Reconciliation, Ar   Sod Fluoride-Potassium Nitrate 1.1-5 % PSTE ceived the following from [facility]: Outside name: sodium fluoride-pot nitrate 1.1-5 % pste 3/13/22  Yes Automatic Reconciliation, Ar   busPIRone (BUSPAR) 5 MG tablet Take 1.5 tablets by mouth 3 times daily    Pau Watson APRN - CNP   letrozole (FEMARA) 2.5 MG tablet Take 1 tablet by mouth daily Patient states she only taking during her cycle.    ProviderAly MD         1/3/2025     2:12 AM   Patient-Reported Vitals   Patient-Reported Weight 234   Patient-Reported Height 5'8''      PHQ-9 Total Score: 11 (1/3/2025  2:14 AM)  Thoughts that you would be better off dead, or of hurting yourself in some way: 0 (1/3/2025  2:14 AM)         1/3/2025     2:14 AM 11/29/2024    10:50 AM 10/31/2024     8:57 AM   PHQ-9    Little interest or pleasure in doing things 1 1 1   Feeling down, depressed, or hopeless 2 2 2   Trouble falling or staying asleep, or sleeping too much 2 2 3   Feeling tired or having little energy 1 1 2   Poor appetite or overeating 2 0 1   Feeling bad about yourself - or that you are a failure or have let yourself or your family down 2 2 3

## 2025-01-14 ENCOUNTER — HOSPITAL ENCOUNTER (OUTPATIENT)
Facility: HOSPITAL | Age: 35
Discharge: HOME | End: 2025-01-17
Payer: COMMERCIAL

## 2025-01-14 ENCOUNTER — OFFICE VISIT (OUTPATIENT)
Facility: CLINIC | Age: 35
End: 2025-01-14

## 2025-01-14 VITALS
HEART RATE: 90 BPM | SYSTOLIC BLOOD PRESSURE: 120 MMHG | WEIGHT: 235.6 LBS | HEIGHT: 67 IN | OXYGEN SATURATION: 98 % | TEMPERATURE: 98.2 F | BODY MASS INDEX: 36.98 KG/M2 | RESPIRATION RATE: 17 BRPM | DIASTOLIC BLOOD PRESSURE: 87 MMHG

## 2025-01-14 DIAGNOSIS — M25.531 RIGHT WRIST PAIN: ICD-10-CM
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
## 2025-01-14 PROCEDURE — 73110 X-RAY EXAM OF WRIST: CPT

## 2025-01-14 RX ORDER — IBUPROFEN 200 MG
200 TABLET ORAL EVERY 6 HOURS PRN
COMMUNITY

## 2025-01-14 ASSESSMENT — PATIENT HEALTH QUESTIONNAIRE - PHQ9
SUM OF ALL RESPONSES TO PHQ QUESTIONS 1-9: 2
2. FEELING DOWN, DEPRESSED OR HOPELESS: MORE THAN HALF THE DAYS
SUM OF ALL RESPONSES TO PHQ QUESTIONS 1-9: 2
SUM OF ALL RESPONSES TO PHQ9 QUESTIONS 1 & 2: 2
1. LITTLE INTEREST OR PLEASURE IN DOING THINGS: NOT AT ALL
SUM OF ALL RESPONSES TO PHQ QUESTIONS 1-9: 2
SUM OF ALL RESPONSES TO PHQ QUESTIONS 1-9: 2

## 2025-01-14 ASSESSMENT — ANXIETY QUESTIONNAIRES
2. NOT BEING ABLE TO STOP OR CONTROL WORRYING: MORE THAN HALF THE DAYS
GAD7 TOTAL SCORE: 14
6. BECOMING EASILY ANNOYED OR IRRITABLE: MORE THAN HALF THE DAYS
7. FEELING AFRAID AS IF SOMETHING AWFUL MIGHT HAPPEN: MORE THAN HALF THE DAYS
IF YOU CHECKED OFF ANY PROBLEMS ON THIS QUESTIONNAIRE, HOW DIFFICULT HAVE THESE PROBLEMS MADE IT FOR YOU TO DO YOUR WORK, TAKE CARE OF THINGS AT HOME, OR GET ALONG WITH OTHER PEOPLE: SOMEWHAT DIFFICULT
3. WORRYING TOO MUCH ABOUT DIFFERENT THINGS: MORE THAN HALF THE DAYS
5. BEING SO RESTLESS THAT IT IS HARD TO SIT STILL: MORE THAN HALF THE DAYS
4. TROUBLE RELAXING: MORE THAN HALF THE DAYS
1. FEELING NERVOUS, ANXIOUS, OR ON EDGE: MORE THAN HALF THE DAYS

## 2025-01-15 NOTE — PROGRESS NOTES
Chief Complaint   Patient presents with    Wrist Injury     Patient states in October she tripped and fell at the store, and hurt her right hand and wrist. Patient states mow she can not bend that wrist and it hurts to move it.     \"Have you been to the ER, urgent care clinic since your last visit?  Hospitalized since your last visit?\"    NO    “Have you seen or consulted any other health care providers outside our system since your last visit?”    NO           HIPPA confirmed by two patient identifiers.    
file.   I have reviewed with the patient details of the assessment and plan and all questions were answered. Relevent patient education was performed.The most recent lab findings were reviewed with the patient.    An After Visit Summary was printed and given to the patient.      Sara Louis MD

## 2025-01-31 ENCOUNTER — TELEMEDICINE (OUTPATIENT)
Age: 35
End: 2025-01-31
Payer: COMMERCIAL

## 2025-01-31 DIAGNOSIS — F41.1 GAD (GENERALIZED ANXIETY DISORDER): Primary | ICD-10-CM

## 2025-01-31 DIAGNOSIS — F90.2 ATTENTION DEFICIT HYPERACTIVITY DISORDER (ADHD), COMBINED TYPE: ICD-10-CM

## 2025-01-31 PROCEDURE — 99214 OFFICE O/P EST MOD 30 MIN: CPT | Performed by: NURSE PRACTITIONER

## 2025-01-31 ASSESSMENT — ANXIETY QUESTIONNAIRES
IF YOU CHECKED OFF ANY PROBLEMS ON THIS QUESTIONNAIRE, HOW DIFFICULT HAVE THESE PROBLEMS MADE IT FOR YOU TO DO YOUR WORK, TAKE CARE OF THINGS AT HOME, OR GET ALONG WITH OTHER PEOPLE: VERY DIFFICULT
2. NOT BEING ABLE TO STOP OR CONTROL WORRYING: MORE THAN HALF THE DAYS
5. BEING SO RESTLESS THAT IT IS HARD TO SIT STILL: SEVERAL DAYS
1. FEELING NERVOUS, ANXIOUS, OR ON EDGE: NEARLY EVERY DAY
6. BECOMING EASILY ANNOYED OR IRRITABLE: SEVERAL DAYS
GAD7 TOTAL SCORE: 12
4. TROUBLE RELAXING: MORE THAN HALF THE DAYS
3. WORRYING TOO MUCH ABOUT DIFFERENT THINGS: MORE THAN HALF THE DAYS
7. FEELING AFRAID AS IF SOMETHING AWFUL MIGHT HAPPEN: SEVERAL DAYS

## 2025-01-31 ASSESSMENT — PATIENT HEALTH QUESTIONNAIRE - PHQ9
1. LITTLE INTEREST OR PLEASURE IN DOING THINGS: MORE THAN HALF THE DAYS
SUM OF ALL RESPONSES TO PHQ QUESTIONS 1-9: 14
SUM OF ALL RESPONSES TO PHQ QUESTIONS 1-9: 13
8. MOVING OR SPEAKING SO SLOWLY THAT OTHER PEOPLE COULD HAVE NOTICED. OR THE OPPOSITE, BEING SO FIGETY OR RESTLESS THAT YOU HAVE BEEN MOVING AROUND A LOT MORE THAN USUAL: NOT AT ALL
3. TROUBLE FALLING OR STAYING ASLEEP: MORE THAN HALF THE DAYS
5. POOR APPETITE OR OVEREATING: SEVERAL DAYS
SUM OF ALL RESPONSES TO PHQ QUESTIONS 1-9: 14
SUM OF ALL RESPONSES TO PHQ9 QUESTIONS 1 & 2: 3
SUM OF ALL RESPONSES TO PHQ QUESTIONS 1-9: 14
6. FEELING BAD ABOUT YOURSELF - OR THAT YOU ARE A FAILURE OR HAVE LET YOURSELF OR YOUR FAMILY DOWN: NEARLY EVERY DAY
9. THOUGHTS THAT YOU WOULD BE BETTER OFF DEAD, OR OF HURTING YOURSELF: SEVERAL DAYS
4. FEELING TIRED OR HAVING LITTLE ENERGY: MORE THAN HALF THE DAYS
7. TROUBLE CONCENTRATING ON THINGS, SUCH AS READING THE NEWSPAPER OR WATCHING TELEVISION: MORE THAN HALF THE DAYS
10. IF YOU CHECKED OFF ANY PROBLEMS, HOW DIFFICULT HAVE THESE PROBLEMS MADE IT FOR YOU TO DO YOUR WORK, TAKE CARE OF THINGS AT HOME, OR GET ALONG WITH OTHER PEOPLE: VERY DIFFICULT
2. FEELING DOWN, DEPRESSED OR HOPELESS: SEVERAL DAYS

## 2025-01-31 ASSESSMENT — COLUMBIA-SUICIDE SEVERITY RATING SCALE - C-SSRS
6. HAVE YOU EVER DONE ANYTHING, STARTED TO DO ANYTHING, OR PREPARED TO DO ANYTHING TO END YOUR LIFE?: NO
1. WITHIN THE PAST MONTH, HAVE YOU WISHED YOU WERE DEAD OR WISHED YOU COULD GO TO SLEEP AND NOT WAKE UP?: YES
2. HAVE YOU ACTUALLY HAD ANY THOUGHTS OF KILLING YOURSELF?: NO

## 2025-01-31 NOTE — PROGRESS NOTES
Dariela Forde, was evaluated through a synchronous (real-time) audio-video encounter. The patient (or guardian if applicable) is aware that this is a billable service, which includes applicable co-pays. This Virtual Visit was conducted with patient's (and/or legal guardian's) consent. Patient identification was verified, and a caregiver was present when appropriate.       The patient was located at Home: [address] [facility] [address]  Provider was located at Facility (Appt Dept): [address] [facility]  [address]  [place] [address]  Confirm you are appropriately licensed, registered, or certified to deliver care in the state where the patient is located as indicated above. If you are not or unsure, please re-schedule the visit: Yes, I confirm.       CHIEF COMPLAINT:  Dariela Forde is a 34 y.o. female and was seen today for follow-up of psychiatric condition and psychotropic medication management.     HPI:    Dariela  reports the following psychiatric symptoms by hx:  depression, anxiety, inattentiveness.  Overall symptoms have been present for years. Currently symptoms are of moderate severity. The symptoms occur intermittently. Patient reports stress in family relationships. She feels her depression is okay but anxiety is somewhat increase with current stress. The patient reports that she has been experiencing a respiratory illness, which has required her to rest and consequently, she has not been taking her ADHD medication. Prior to her illness, she was taking Adderall consistently and noticed improvements in her functioning. She continues to experience benefits from the propranolol for her anxiety. No complaint of chest pain, dizziness, palpitations, headaches. Patient Appetite: Fair. Sleep: better but still broken. Patient denies symptoms of psychosis or art. Denies suicidal or homicidal ideation.       Current Outpatient Medications on File Prior to Visit   Medication Sig Dispense Refill

## 2025-01-31 NOTE — PROGRESS NOTES
Chief Complaint   Patient presents with    Medication Check     Prior to Admission medications    Medication Sig Start Date End Date Taking? Authorizing Provider   ibuprofen (ADVIL;MOTRIN) 200 MG tablet Take 1 tablet by mouth every 6 hours as needed for Pain Take 3 tablets every 6 hours as needed.   Yes Aly Montaño MD   amphetamine-dextroamphetamine (ADDERALL XR) 15 MG extended release capsule Take 1 capsule by mouth daily for 30 days. Max Daily Amount: 15 mg 1/3/25 2/2/25 Yes Pam Javier APRN - NP   propranolol (INDERAL) 10 MG tablet Take 1 tablet by mouth 3 times daily as needed (for anxiety) 12/2/24  Yes Pam Javier APRN - NP   Cholecalciferol (VITAMIN D3) 125 MCG (5000 UT) TABS Take by mouth   Yes ProviderAly MD   Prenatal MV-Min-Fe Fum-FA-DHA (PRENATAL+DHA PO) Take by mouth   Yes Aly Montaño MD   fexofenadine (ALLEGRA) 180 MG tablet Take 1 tablet by mouth daily as needed   Yes Automatic Reconciliation, Ar   Sod Fluoride-Potassium Nitrate 1.1-5 % PSTE ceived the following from [facility]: Outside name: sodium fluoride-pot nitrate 1.1-5 % pste 3/13/22  Yes Automatic Reconciliation, Ar         1/3/2025     2:12 AM   Patient-Reported Vitals   Patient-Reported Weight 234   Patient-Reported Height 5'8''      PHQ-9 Total Score: 14 (1/31/2025  9:46 AM)  Thoughts that you would be better off dead, or of hurting yourself in some way: 1 (1/31/2025  9:46 AM)         1/31/2025     9:46 AM 1/14/2025     2:11 PM 1/3/2025     2:14 AM   PHQ-9    Little interest or pleasure in doing things 2 0 1   Feeling down, depressed, or hopeless 1 2 2   Trouble falling or staying asleep, or sleeping too much 2  2   Feeling tired or having little energy 2  1   Poor appetite or overeating 1  2   Feeling bad about yourself - or that you are a failure or have let yourself or your family down 3  2   Trouble concentrating on things, such as reading the newspaper or watching

## 2025-05-29 DIAGNOSIS — F41.1 GAD (GENERALIZED ANXIETY DISORDER): ICD-10-CM

## 2025-05-29 RX ORDER — PROPRANOLOL HYDROCHLORIDE 10 MG/1
10 TABLET ORAL 3 TIMES DAILY PRN
Qty: 90 TABLET | Refills: 1 | Status: SHIPPED | OUTPATIENT
Start: 2025-05-29